# Patient Record
Sex: MALE | Race: WHITE | NOT HISPANIC OR LATINO
[De-identification: names, ages, dates, MRNs, and addresses within clinical notes are randomized per-mention and may not be internally consistent; named-entity substitution may affect disease eponyms.]

---

## 2017-01-11 ENCOUNTER — MEDICATION RENEWAL (OUTPATIENT)
Age: 72
End: 2017-01-11

## 2017-03-28 ENCOUNTER — APPOINTMENT (OUTPATIENT)
Dept: CARDIOLOGY | Facility: CLINIC | Age: 72
End: 2017-03-28

## 2017-03-29 ENCOUNTER — LABORATORY RESULT (OUTPATIENT)
Age: 72
End: 2017-03-29

## 2017-03-29 ENCOUNTER — NON-APPOINTMENT (OUTPATIENT)
Age: 72
End: 2017-03-29

## 2017-03-29 ENCOUNTER — APPOINTMENT (OUTPATIENT)
Dept: CARDIOLOGY | Facility: CLINIC | Age: 72
End: 2017-03-29

## 2017-03-29 VITALS
DIASTOLIC BLOOD PRESSURE: 100 MMHG | HEIGHT: 72 IN | WEIGHT: 269 LBS | BODY MASS INDEX: 36.44 KG/M2 | HEART RATE: 75 BPM | OXYGEN SATURATION: 98 % | SYSTOLIC BLOOD PRESSURE: 150 MMHG

## 2017-03-29 RX ORDER — FINASTERIDE 5 MG/1
5 TABLET, FILM COATED ORAL DAILY
Qty: 90 | Refills: 3 | Status: ACTIVE | COMMUNITY
Start: 2017-03-29

## 2017-03-31 LAB
ALBUMIN SERPL ELPH-MCNC: 4.2 G/DL
ALP BLD-CCNC: 41 U/L
ALT SERPL-CCNC: 29 U/L
ANION GAP SERPL CALC-SCNC: 13 MMOL/L
AST SERPL-CCNC: 31 U/L
BASOPHILS # BLD AUTO: 0.03 K/UL
BASOPHILS NFR BLD AUTO: 0.4 %
BILIRUB SERPL-MCNC: 0.4 MG/DL
BUN SERPL-MCNC: 19 MG/DL
CALCIUM SERPL-MCNC: 9.3 MG/DL
CHLORIDE SERPL-SCNC: 103 MMOL/L
CHOLEST SERPL-MCNC: 151 MG/DL
CHOLEST/HDLC SERPL: 3.3 RATIO
CO2 SERPL-SCNC: 26 MMOL/L
CREAT SERPL-MCNC: 0.92 MG/DL
EOSINOPHIL # BLD AUTO: 0.33 K/UL
EOSINOPHIL NFR BLD AUTO: 4.5 %
GLUCOSE SERPL-MCNC: 104 MG/DL
HCT VFR BLD CALC: 44.5 %
HDLC SERPL-MCNC: 46 MG/DL
HGB BLD-MCNC: 14.7 G/DL
IMM GRANULOCYTES NFR BLD AUTO: 0.8 %
LDLC SERPL CALC-MCNC: 64 MG/DL
LYMPHOCYTES # BLD AUTO: 1.46 K/UL
LYMPHOCYTES NFR BLD AUTO: 20.1 %
MAN DIFF?: NORMAL
MCHC RBC-ENTMCNC: 30.8 PG
MCHC RBC-ENTMCNC: 33 GM/DL
MCV RBC AUTO: 93.1 FL
MONOCYTES # BLD AUTO: 0.76 K/UL
MONOCYTES NFR BLD AUTO: 10.4 %
NEUTROPHILS # BLD AUTO: 4.64 K/UL
NEUTROPHILS NFR BLD AUTO: 63.8 %
PLATELET # BLD AUTO: 100 K/UL
POTASSIUM SERPL-SCNC: 4.4 MMOL/L
PROT SERPL-MCNC: 7.1 G/DL
RBC # BLD: 4.78 M/UL
RBC # FLD: 14.1 %
SODIUM SERPL-SCNC: 142 MMOL/L
TRIGL SERPL-MCNC: 203 MG/DL
TSH SERPL-ACNC: 2.06 UIU/ML
WBC # FLD AUTO: 7.28 K/UL

## 2017-08-22 ENCOUNTER — APPOINTMENT (OUTPATIENT)
Dept: CARDIOLOGY | Facility: CLINIC | Age: 72
End: 2017-08-22
Payer: MEDICARE

## 2017-08-22 ENCOUNTER — NON-APPOINTMENT (OUTPATIENT)
Age: 72
End: 2017-08-22

## 2017-08-22 VITALS
HEIGHT: 72 IN | OXYGEN SATURATION: 93 % | SYSTOLIC BLOOD PRESSURE: 104 MMHG | DIASTOLIC BLOOD PRESSURE: 70 MMHG | BODY MASS INDEX: 37.38 KG/M2 | WEIGHT: 276 LBS | HEART RATE: 62 BPM

## 2017-08-22 PROCEDURE — 93000 ELECTROCARDIOGRAM COMPLETE: CPT

## 2017-08-22 PROCEDURE — 99214 OFFICE O/P EST MOD 30 MIN: CPT

## 2017-09-06 ENCOUNTER — OTHER (OUTPATIENT)
Age: 72
End: 2017-09-06

## 2017-09-06 ENCOUNTER — APPOINTMENT (OUTPATIENT)
Dept: CARDIOLOGY | Facility: CLINIC | Age: 72
End: 2017-09-06
Payer: MEDICARE

## 2017-09-06 PROCEDURE — 93306 TTE W/DOPPLER COMPLETE: CPT

## 2017-09-29 ENCOUNTER — MEDICATION RENEWAL (OUTPATIENT)
Age: 72
End: 2017-09-29

## 2018-01-30 ENCOUNTER — OTHER (OUTPATIENT)
Age: 73
End: 2018-01-30

## 2018-04-03 ENCOUNTER — OTHER (OUTPATIENT)
Age: 73
End: 2018-04-03

## 2018-04-04 ENCOUNTER — OTHER (OUTPATIENT)
Age: 73
End: 2018-04-04

## 2018-05-08 ENCOUNTER — APPOINTMENT (OUTPATIENT)
Dept: CARDIOLOGY | Facility: CLINIC | Age: 73
End: 2018-05-08
Payer: MEDICARE

## 2018-05-08 ENCOUNTER — NON-APPOINTMENT (OUTPATIENT)
Age: 73
End: 2018-05-08

## 2018-05-08 VITALS
BODY MASS INDEX: 36.03 KG/M2 | WEIGHT: 266 LBS | HEIGHT: 72 IN | OXYGEN SATURATION: 95 % | DIASTOLIC BLOOD PRESSURE: 80 MMHG | SYSTOLIC BLOOD PRESSURE: 117 MMHG | HEART RATE: 71 BPM

## 2018-05-08 PROCEDURE — 93000 ELECTROCARDIOGRAM COMPLETE: CPT

## 2018-05-08 PROCEDURE — 99215 OFFICE O/P EST HI 40 MIN: CPT

## 2018-05-11 ENCOUNTER — APPOINTMENT (OUTPATIENT)
Dept: CARDIOLOGY | Facility: CLINIC | Age: 73
End: 2018-05-11
Payer: MEDICARE

## 2018-05-11 VITALS — WEIGHT: 266 LBS | HEART RATE: 71 BPM | BODY MASS INDEX: 36.03 KG/M2 | HEIGHT: 72 IN | OXYGEN SATURATION: 95 %

## 2018-05-11 LAB
INR PPP: 1.3 RATIO
QUALITY CONTROL: YES

## 2018-05-11 PROCEDURE — 85610 PROTHROMBIN TIME: CPT | Mod: QW

## 2018-05-11 PROCEDURE — 99211 OFF/OP EST MAY X REQ PHY/QHP: CPT

## 2018-05-15 ENCOUNTER — APPOINTMENT (OUTPATIENT)
Dept: CARDIOLOGY | Facility: CLINIC | Age: 73
End: 2018-05-15
Payer: MEDICARE

## 2018-05-15 PROCEDURE — 93282 PRGRMG EVAL IMPLANTABLE DFB: CPT

## 2018-05-16 ENCOUNTER — APPOINTMENT (OUTPATIENT)
Dept: ELECTROPHYSIOLOGY | Facility: CLINIC | Age: 73
End: 2018-05-16
Payer: MEDICARE

## 2018-05-16 ENCOUNTER — NON-APPOINTMENT (OUTPATIENT)
Age: 73
End: 2018-05-16

## 2018-05-16 VITALS
DIASTOLIC BLOOD PRESSURE: 86 MMHG | OXYGEN SATURATION: 96 % | SYSTOLIC BLOOD PRESSURE: 132 MMHG | WEIGHT: 259 LBS | HEIGHT: 72 IN | HEART RATE: 75 BPM | BODY MASS INDEX: 35.08 KG/M2

## 2018-05-16 PROCEDURE — 99215 OFFICE O/P EST HI 40 MIN: CPT

## 2018-05-16 PROCEDURE — 93000 ELECTROCARDIOGRAM COMPLETE: CPT

## 2018-05-18 ENCOUNTER — APPOINTMENT (OUTPATIENT)
Dept: CARDIOLOGY | Facility: CLINIC | Age: 73
End: 2018-05-18
Payer: MEDICARE

## 2018-05-18 VITALS — HEART RATE: 75 BPM | BODY MASS INDEX: 35.08 KG/M2 | HEIGHT: 72 IN | WEIGHT: 259 LBS

## 2018-05-18 LAB
INR PPP: 2.3 RATIO
QUALITY CONTROL: YES

## 2018-05-18 PROCEDURE — 85610 PROTHROMBIN TIME: CPT | Mod: QW

## 2018-05-18 PROCEDURE — 99211 OFF/OP EST MAY X REQ PHY/QHP: CPT

## 2018-05-31 ENCOUNTER — APPOINTMENT (OUTPATIENT)
Dept: CARDIOLOGY | Facility: CLINIC | Age: 73
End: 2018-05-31
Payer: MEDICARE

## 2018-05-31 VITALS — HEART RATE: 75 BPM | HEIGHT: 72 IN | WEIGHT: 259 LBS | BODY MASS INDEX: 35.08 KG/M2

## 2018-05-31 LAB
INR PPP: 3.6 RATIO
QUALITY CONTROL: YES

## 2018-05-31 PROCEDURE — 85610 PROTHROMBIN TIME: CPT | Mod: QW

## 2018-05-31 PROCEDURE — 99211 OFF/OP EST MAY X REQ PHY/QHP: CPT

## 2018-05-31 RX ORDER — WARFARIN 5 MG/1
5 TABLET ORAL DAILY
Qty: 90 | Refills: 1 | Status: ACTIVE | COMMUNITY
Start: 2018-05-31

## 2018-06-07 ENCOUNTER — APPOINTMENT (OUTPATIENT)
Dept: CARDIOLOGY | Facility: CLINIC | Age: 73
End: 2018-06-07
Payer: MEDICARE

## 2018-06-07 VITALS — WEIGHT: 259 LBS | HEIGHT: 72 IN | BODY MASS INDEX: 35.08 KG/M2 | HEART RATE: 75 BPM

## 2018-06-07 LAB
INR PPP: 2.2 RATIO
QUALITY CONTROL: YES

## 2018-06-07 PROCEDURE — 85610 PROTHROMBIN TIME: CPT | Mod: QW

## 2018-06-07 PROCEDURE — 99211 OFF/OP EST MAY X REQ PHY/QHP: CPT

## 2018-06-14 ENCOUNTER — APPOINTMENT (OUTPATIENT)
Dept: CARDIOLOGY | Facility: CLINIC | Age: 73
End: 2018-06-14
Payer: MEDICARE

## 2018-06-14 VITALS — WEIGHT: 259 LBS | HEIGHT: 72 IN | HEART RATE: 75 BPM | BODY MASS INDEX: 35.08 KG/M2

## 2018-06-14 LAB
INR PPP: 2.4 RATIO
QUALITY CONTROL: YES

## 2018-06-14 PROCEDURE — 93793 ANTICOAG MGMT PT WARFARIN: CPT

## 2018-06-14 PROCEDURE — 85610 PROTHROMBIN TIME: CPT | Mod: QW

## 2018-06-22 ENCOUNTER — APPOINTMENT (OUTPATIENT)
Dept: CARDIOLOGY | Facility: CLINIC | Age: 73
End: 2018-06-22
Payer: MEDICARE

## 2018-06-22 ENCOUNTER — NON-APPOINTMENT (OUTPATIENT)
Age: 73
End: 2018-06-22

## 2018-06-22 VITALS
WEIGHT: 269 LBS | HEIGHT: 72 IN | BODY MASS INDEX: 36.44 KG/M2 | HEART RATE: 85 BPM | SYSTOLIC BLOOD PRESSURE: 162 MMHG | DIASTOLIC BLOOD PRESSURE: 91 MMHG | OXYGEN SATURATION: 93 %

## 2018-06-22 PROCEDURE — 93000 ELECTROCARDIOGRAM COMPLETE: CPT

## 2018-06-22 PROCEDURE — 99214 OFFICE O/P EST MOD 30 MIN: CPT

## 2018-06-28 ENCOUNTER — APPOINTMENT (OUTPATIENT)
Dept: CARDIOLOGY | Facility: CLINIC | Age: 73
End: 2018-06-28

## 2018-07-11 ENCOUNTER — APPOINTMENT (OUTPATIENT)
Dept: CARDIOLOGY | Facility: CLINIC | Age: 73
End: 2018-07-11
Payer: MEDICARE

## 2018-07-11 VITALS — BODY MASS INDEX: 36.44 KG/M2 | WEIGHT: 269 LBS | HEIGHT: 72 IN | HEART RATE: 85 BPM

## 2018-07-11 LAB
INR PPP: 2.3 RATIO
QUALITY CONTROL: YES

## 2018-07-11 PROCEDURE — 93793 ANTICOAG MGMT PT WARFARIN: CPT

## 2018-07-11 PROCEDURE — 85610 PROTHROMBIN TIME: CPT | Mod: QW

## 2018-08-13 ENCOUNTER — APPOINTMENT (OUTPATIENT)
Dept: CARDIOLOGY | Facility: CLINIC | Age: 73
End: 2018-08-13
Payer: MEDICARE

## 2018-08-13 VITALS — HEIGHT: 72 IN | WEIGHT: 269 LBS | HEART RATE: 85 BPM | BODY MASS INDEX: 36.44 KG/M2

## 2018-08-13 LAB
INR PPP: 3.9 RATIO
QUALITY CONTROL: YES

## 2018-08-13 PROCEDURE — 93793 ANTICOAG MGMT PT WARFARIN: CPT

## 2018-08-13 PROCEDURE — 85610 PROTHROMBIN TIME: CPT | Mod: QW

## 2018-08-17 ENCOUNTER — NON-APPOINTMENT (OUTPATIENT)
Age: 73
End: 2018-08-17

## 2018-08-17 ENCOUNTER — APPOINTMENT (OUTPATIENT)
Dept: CARDIOLOGY | Facility: CLINIC | Age: 73
End: 2018-08-17
Payer: MEDICARE

## 2018-08-17 VITALS
SYSTOLIC BLOOD PRESSURE: 102 MMHG | HEIGHT: 72 IN | OXYGEN SATURATION: 93 % | HEART RATE: 78 BPM | DIASTOLIC BLOOD PRESSURE: 68 MMHG | WEIGHT: 268.03 LBS | BODY MASS INDEX: 36.3 KG/M2

## 2018-08-17 PROCEDURE — 99214 OFFICE O/P EST MOD 30 MIN: CPT

## 2018-08-17 PROCEDURE — 93000 ELECTROCARDIOGRAM COMPLETE: CPT

## 2018-08-27 ENCOUNTER — APPOINTMENT (OUTPATIENT)
Dept: CARDIOLOGY | Facility: CLINIC | Age: 73
End: 2018-08-27
Payer: MEDICARE

## 2018-08-27 VITALS — WEIGHT: 268 LBS | HEART RATE: 78 BPM | HEIGHT: 72 IN | BODY MASS INDEX: 36.3 KG/M2

## 2018-08-27 LAB
INR PPP: 1.4 RATIO
QUALITY CONTROL: YES

## 2018-08-27 PROCEDURE — 93793 ANTICOAG MGMT PT WARFARIN: CPT

## 2018-08-27 PROCEDURE — 85610 PROTHROMBIN TIME: CPT | Mod: QW

## 2018-08-27 RX ORDER — ROSUVASTATIN CALCIUM 10 MG/1
10 TABLET, FILM COATED ORAL
Qty: 90 | Refills: 3 | Status: ACTIVE | COMMUNITY
Start: 2018-08-27

## 2018-08-28 ENCOUNTER — APPOINTMENT (OUTPATIENT)
Dept: CARDIOLOGY | Facility: CLINIC | Age: 73
End: 2018-08-28
Payer: MEDICARE

## 2018-08-28 PROCEDURE — 93283 PRGRMG EVAL IMPLANTABLE DFB: CPT

## 2018-09-26 ENCOUNTER — NON-APPOINTMENT (OUTPATIENT)
Age: 73
End: 2018-09-26

## 2018-09-26 ENCOUNTER — APPOINTMENT (OUTPATIENT)
Dept: CARDIOLOGY | Facility: CLINIC | Age: 73
End: 2018-09-26
Payer: MEDICARE

## 2018-09-26 VITALS
BODY MASS INDEX: 36.03 KG/M2 | OXYGEN SATURATION: 96 % | HEIGHT: 72 IN | DIASTOLIC BLOOD PRESSURE: 78 MMHG | SYSTOLIC BLOOD PRESSURE: 119 MMHG | WEIGHT: 266 LBS | HEART RATE: 76 BPM

## 2018-09-26 DIAGNOSIS — I10 ESSENTIAL (PRIMARY) HYPERTENSION: ICD-10-CM

## 2018-09-26 PROCEDURE — 99214 OFFICE O/P EST MOD 30 MIN: CPT

## 2018-09-26 PROCEDURE — 93000 ELECTROCARDIOGRAM COMPLETE: CPT

## 2018-10-02 ENCOUNTER — CHART COPY (OUTPATIENT)
Age: 73
End: 2018-10-02

## 2018-10-02 LAB — INR PPP: 1.2

## 2018-10-09 ENCOUNTER — APPOINTMENT (OUTPATIENT)
Dept: CARDIOLOGY | Facility: CLINIC | Age: 73
End: 2018-10-09

## 2018-10-22 ENCOUNTER — APPOINTMENT (OUTPATIENT)
Dept: CARDIOLOGY | Facility: CLINIC | Age: 73
End: 2018-10-22
Payer: MEDICARE

## 2018-10-22 VITALS — WEIGHT: 266 LBS | BODY MASS INDEX: 36.03 KG/M2 | HEART RATE: 76 BPM | HEIGHT: 72 IN

## 2018-10-22 LAB
INR PPP: 3.2 RATIO
QUALITY CONTROL: YES

## 2018-10-22 PROCEDURE — 93793 ANTICOAG MGMT PT WARFARIN: CPT

## 2018-10-22 PROCEDURE — 85610 PROTHROMBIN TIME: CPT | Mod: QW

## 2018-11-02 ENCOUNTER — APPOINTMENT (OUTPATIENT)
Dept: CARDIOLOGY | Facility: CLINIC | Age: 73
End: 2018-11-02
Payer: MEDICARE

## 2018-11-02 ENCOUNTER — NON-APPOINTMENT (OUTPATIENT)
Age: 73
End: 2018-11-02

## 2018-11-02 VITALS
HEIGHT: 72 IN | BODY MASS INDEX: 35.89 KG/M2 | WEIGHT: 265 LBS | SYSTOLIC BLOOD PRESSURE: 111 MMHG | HEART RATE: 81 BPM | DIASTOLIC BLOOD PRESSURE: 75 MMHG | OXYGEN SATURATION: 95 %

## 2018-11-02 PROCEDURE — 93000 ELECTROCARDIOGRAM COMPLETE: CPT

## 2018-11-02 PROCEDURE — 99214 OFFICE O/P EST MOD 30 MIN: CPT

## 2018-11-05 ENCOUNTER — APPOINTMENT (OUTPATIENT)
Dept: CARDIOLOGY | Facility: CLINIC | Age: 73
End: 2018-11-05

## 2018-12-18 ENCOUNTER — APPOINTMENT (OUTPATIENT)
Dept: CARDIOLOGY | Facility: CLINIC | Age: 73
End: 2018-12-18
Payer: MEDICARE

## 2018-12-18 PROCEDURE — 93282 PRGRMG EVAL IMPLANTABLE DFB: CPT

## 2019-02-26 ENCOUNTER — APPOINTMENT (OUTPATIENT)
Dept: CARDIOLOGY | Facility: CLINIC | Age: 74
End: 2019-02-26

## 2019-02-26 ENCOUNTER — APPOINTMENT (OUTPATIENT)
Dept: ELECTROPHYSIOLOGY | Facility: CLINIC | Age: 74
End: 2019-02-26
Payer: MEDICARE

## 2019-02-26 ENCOUNTER — NON-APPOINTMENT (OUTPATIENT)
Age: 74
End: 2019-02-26

## 2019-02-26 ENCOUNTER — APPOINTMENT (OUTPATIENT)
Dept: CARDIOLOGY | Facility: CLINIC | Age: 74
End: 2019-02-26
Payer: MEDICARE

## 2019-02-26 VITALS
HEIGHT: 72 IN | SYSTOLIC BLOOD PRESSURE: 103 MMHG | WEIGHT: 262 LBS | DIASTOLIC BLOOD PRESSURE: 70 MMHG | HEART RATE: 76 BPM | OXYGEN SATURATION: 97 % | BODY MASS INDEX: 35.49 KG/M2

## 2019-02-26 VITALS
WEIGHT: 262 LBS | BODY MASS INDEX: 35.49 KG/M2 | DIASTOLIC BLOOD PRESSURE: 69 MMHG | HEIGHT: 72 IN | HEART RATE: 77 BPM | OXYGEN SATURATION: 97 % | SYSTOLIC BLOOD PRESSURE: 104 MMHG

## 2019-02-26 DIAGNOSIS — I47.2 VENTRICULAR TACHYCARDIA: ICD-10-CM

## 2019-02-26 PROCEDURE — 93000 ELECTROCARDIOGRAM COMPLETE: CPT

## 2019-02-26 PROCEDURE — 93283 PRGRMG EVAL IMPLANTABLE DFB: CPT

## 2019-02-26 PROCEDURE — 99215 OFFICE O/P EST HI 40 MIN: CPT

## 2019-02-26 RX ORDER — CLOPIDOGREL BISULFATE 75 MG/1
75 TABLET, FILM COATED ORAL
Qty: 90 | Refills: 3 | Status: DISCONTINUED | COMMUNITY
Start: 2018-05-08 | End: 2019-02-26

## 2019-02-27 ENCOUNTER — APPOINTMENT (OUTPATIENT)
Dept: CARDIOLOGY | Facility: CLINIC | Age: 74
End: 2019-02-27

## 2019-03-25 ENCOUNTER — NON-APPOINTMENT (OUTPATIENT)
Age: 74
End: 2019-03-25

## 2019-03-25 ENCOUNTER — APPOINTMENT (OUTPATIENT)
Dept: CARDIOLOGY | Facility: CLINIC | Age: 74
End: 2019-03-25
Payer: MEDICARE

## 2019-03-25 VITALS
WEIGHT: 265 LBS | HEART RATE: 88 BPM | OXYGEN SATURATION: 95 % | HEIGHT: 72 IN | DIASTOLIC BLOOD PRESSURE: 74 MMHG | SYSTOLIC BLOOD PRESSURE: 110 MMHG | BODY MASS INDEX: 35.89 KG/M2

## 2019-03-25 PROCEDURE — 99214 OFFICE O/P EST MOD 30 MIN: CPT

## 2019-03-25 PROCEDURE — 93000 ELECTROCARDIOGRAM COMPLETE: CPT

## 2019-03-25 NOTE — REVIEW OF SYSTEMS
[Shortness Of Breath] : shortness of breath [Dyspnea on exertion] : dyspnea during exertion [Chest  Pressure] : chest pressure [Chest Pain] : chest pain [Lower Ext Edema] : lower extremity edema [Negative] : Heme/Lymph

## 2019-03-25 NOTE — REASON FOR VISIT
[Atrial Fibrillation] : atrial fibrillation [Cardiomyopathy] : cardiomyopathy [Coronary Artery Disease] : coronary artery disease [Ventricular Tachycardia] : ventricular tachycardia

## 2019-03-25 NOTE — HISTORY OF PRESENT ILLNESS
[FreeTextEntry1] : Mr. Hardin returns for evaluation of his VF, heart failure, coronary artery disease, and atrial fibrillation. He had 2 shocks with syncope in February and was evaluated by electrophysiology. He was started on her nose again although he [facility]. His base rate pacing was increased to 80 per minute. He has not had any further episodes and believes that his energy is somewhat better\par \par \par IN March 2018 he was standing in a restaurant and hitting the back of his head. He reportedly had ventricular fibrillation arrest in his seat ICD fired in conjunction with CPR and ACLS protocol for which he was taken to [facility]. He ultimately underwent percutaneous intervention with stenting to a saphenous vein graft with no significant cardiac enzyme elevation. His course was complicated by hematuria causing warfarin to be discontinued felt to be due to a ruptured renal cyst in the setting of his fall and trauma.\par \par While in [place] last year, his Tikosyn was discontinued for prolongation of his QT interval and he did not tolerate amiodarone or Sotalol. He thinks he is mostly in AF lately.  On a positive note, he remarried his wife of many years that he previously got  from.\par \par Past medical history is remarkable for PAF, BiV ICD with Riata lead followed by Dr. Miller, ischemic CM, hyperlipidemia, coronary artery disease, hypertension, obstructive sleep apnea, posttraumatic stress disorder, agent orange exposure, paroxysmal atrial fibrillation, obesity, status post umbilical hernia repair, osteoarthritis, myocardial infarction, coronary artery bypass surgery approximately 20 years ago, gout, cholecystectomy.

## 2019-03-25 NOTE — DISCUSSION/SUMMARY
[FreeTextEntry1] : Guy has had a complicated course after presenting in [place] with a reported ventricular fibrillation arrest for which his ICD fired appropriately last year . He did not have a significant myocardial infarction and whether or not he benefited from the percutaneous intervention he had in [place] is unclear\par He now presents with recurrent ventricular tachycardia/ventricular fibrillation leading to syncope and ICD shock. We had an extensive discussion concerning his complicated history and counseling represented greater than 50% of his visit counseling and coordination of 35 minutes in duration. Spoke with Dr. Mistry and Angela and we discussed the possibilities of ablation versus other medical therapy. He did not tolerate dofetilide or amiodarone and we did discuss the possibility of adding quinidine. After their evaluation, where no lithium was added although he has not [facility]. He is to start 500 mg twice daily for 7 days and then increase to 1000 mg twice daily. Also, his basic pacing rate was increased to 80 beats per minute. He will followup with electrophysiology and we will see him again in approximately 2 months. He is planning a cruise for this summer.

## 2019-03-25 NOTE — PHYSICAL EXAM
[General Appearance - Well Developed] : well developed [Normal Appearance] : normal appearance [Well Groomed] : well groomed [General Appearance - Well Nourished] : well nourished [No Deformities] : no deformities [General Appearance - In No Acute Distress] : no acute distress [Normal Conjunctiva] : the conjunctiva exhibited no abnormalities [Eyelids - No Xanthelasma] : the eyelids demonstrated no xanthelasmas [Normal Oral Mucosa] : normal oral mucosa [No Oral Pallor] : no oral pallor [No Oral Cyanosis] : no oral cyanosis [Normal Jugular Venous A Waves Present] : normal jugular venous A waves present [Normal Jugular Venous V Waves Present] : normal jugular venous V waves present [No Jugular Venous Bermudez A Waves] : no jugular venous bermudez A waves [Respiration, Rhythm And Depth] : normal respiratory rhythm and effort [Exaggerated Use Of Accessory Muscles For Inspiration] : no accessory muscle use [Auscultation Breath Sounds / Voice Sounds] : lungs were clear to auscultation bilaterally [Abdomen Soft] : soft [Abdomen Tenderness] : non-tender [Abdomen Mass (___ Cm)] : no abdominal mass palpated [Abnormal Walk] : normal gait [Gait - Sufficient For Exercise Testing] : the gait was sufficient for exercise testing [Nail Clubbing] : no clubbing of the fingernails [Cyanosis, Localized] : no localized cyanosis [Petechial Hemorrhages (___cm)] : no petechial hemorrhages [Skin Color & Pigmentation] : normal skin color and pigmentation [] : no rash [No Venous Stasis] : no venous stasis [Skin Lesions] : no skin lesions [No Skin Ulcers] : no skin ulcer [No Xanthoma] : no  xanthoma was observed [Oriented To Time, Place, And Person] : oriented to person, place, and time [Affect] : the affect was normal [Mood] : the mood was normal [No Anxiety] : not feeling anxious [Not Palpable] : not palpable [No Precordial Heave] : no precordial heave was noted [Normal Rate] : normal [Heart Rate ___] : [unfilled] bpm [Irregularly Irregular] : irregularly irregular [Normal S1] : normal S1 [Normal S2] : normal S2 [No Gallop] : no gallop heard [No Murmur] : no murmurs heard [2+] : left 2+ [No Abnormalities] : the abdominal aorta was not enlarged and no bruit was heard [Apical Thrill] : no thrill palpable at the apex [S3] : no S3 [S4] : no S4 [Click] : no click [Pericardial Rub] : no pericardial rub [Right Carotid Bruit] : no bruit heard over the right carotid [Left Carotid Bruit] : no bruit heard over the left carotid [Right Femoral Bruit] : no bruit heard over the right femoral artery [Left Femoral Bruit] : no bruit heard over the left femoral artery [Bruit] : no bruit heard [Rt] : no varicose veins of the right leg [Lt] : no varicose veins of the left leg

## 2019-03-25 NOTE — CARDIOLOGY SUMMARY
[No Ischemia] : no Ischemia [LVEF ___%] : LVEF [unfilled]% [Moderate] : moderate LV dysfunction [None] : no pulmonary hypertension [Enlarged] : enlarged LA size [Mild] : mild mitral regurgitation [___] : [unfilled] [___] : [unfilled]

## 2019-03-27 ENCOUNTER — APPOINTMENT (OUTPATIENT)
Dept: ELECTROPHYSIOLOGY | Facility: CLINIC | Age: 74
End: 2019-03-27

## 2019-04-22 ENCOUNTER — OTHER (OUTPATIENT)
Age: 74
End: 2019-04-22

## 2019-04-22 RX ORDER — RANOLAZINE 500 MG/1
500 TABLET, EXTENDED RELEASE ORAL
Qty: 14 | Refills: 0 | Status: DISCONTINUED | OUTPATIENT
End: 2019-04-22

## 2019-06-05 ENCOUNTER — NON-APPOINTMENT (OUTPATIENT)
Age: 74
End: 2019-06-05

## 2019-06-05 ENCOUNTER — APPOINTMENT (OUTPATIENT)
Dept: CARDIOLOGY | Facility: CLINIC | Age: 74
End: 2019-06-05
Payer: MEDICARE

## 2019-06-05 VITALS
DIASTOLIC BLOOD PRESSURE: 63 MMHG | BODY MASS INDEX: 35.76 KG/M2 | SYSTOLIC BLOOD PRESSURE: 94 MMHG | HEART RATE: 88 BPM | OXYGEN SATURATION: 96 % | WEIGHT: 264 LBS | HEIGHT: 72 IN

## 2019-06-05 PROCEDURE — 99214 OFFICE O/P EST MOD 30 MIN: CPT

## 2019-06-05 PROCEDURE — 93000 ELECTROCARDIOGRAM COMPLETE: CPT

## 2019-06-05 RX ORDER — DILTIAZEM HYDROCHLORIDE 180 MG/1
180 CAPSULE, EXTENDED RELEASE ORAL DAILY
Qty: 30 | Refills: 3 | Status: DISCONTINUED | COMMUNITY
Start: 2018-08-17 | End: 2019-06-05

## 2019-09-24 ENCOUNTER — NON-APPOINTMENT (OUTPATIENT)
Age: 74
End: 2019-09-24

## 2019-09-24 ENCOUNTER — APPOINTMENT (OUTPATIENT)
Dept: CARDIOLOGY | Facility: CLINIC | Age: 74
End: 2019-09-24
Payer: MEDICARE

## 2019-09-24 VITALS
OXYGEN SATURATION: 94 % | WEIGHT: 262 LBS | HEIGHT: 72 IN | SYSTOLIC BLOOD PRESSURE: 104 MMHG | HEART RATE: 100 BPM | BODY MASS INDEX: 35.49 KG/M2 | DIASTOLIC BLOOD PRESSURE: 71 MMHG

## 2019-09-24 PROCEDURE — 93000 ELECTROCARDIOGRAM COMPLETE: CPT

## 2019-09-24 PROCEDURE — 99214 OFFICE O/P EST MOD 30 MIN: CPT

## 2019-09-24 RX ORDER — RANOLAZINE 500 MG/1
500 TABLET, FILM COATED, EXTENDED RELEASE ORAL
Refills: 3 | Status: ACTIVE | COMMUNITY

## 2019-10-24 ENCOUNTER — APPOINTMENT (RX ONLY)
Dept: URBAN - METROPOLITAN AREA CLINIC 124 | Facility: CLINIC | Age: 74
Setting detail: DERMATOLOGY
End: 2019-10-24

## 2019-10-24 DIAGNOSIS — L72.0 EPIDERMAL CYST: ICD-10-CM | Status: RESOLVING

## 2019-10-24 DIAGNOSIS — D18.0 HEMANGIOMA: ICD-10-CM

## 2019-10-24 DIAGNOSIS — D22 MELANOCYTIC NEVI: ICD-10-CM

## 2019-10-24 DIAGNOSIS — L82.1 OTHER SEBORRHEIC KERATOSIS: ICD-10-CM

## 2019-10-24 PROBLEM — D22.4 MELANOCYTIC NEVI OF SCALP AND NECK: Status: ACTIVE | Noted: 2019-10-24

## 2019-10-24 PROBLEM — D18.01 HEMANGIOMA OF SKIN AND SUBCUTANEOUS TISSUE: Status: ACTIVE | Noted: 2019-10-24

## 2019-10-24 PROCEDURE — 17110 DESTRUCTION B9 LES UP TO 14: CPT | Mod: NMN

## 2019-10-24 PROCEDURE — 99203 OFFICE O/P NEW LOW 30 MIN: CPT | Mod: 25

## 2019-10-24 PROCEDURE — ? COUNSELING

## 2019-10-24 PROCEDURE — ? BENIGN DESTRUCTION

## 2019-10-24 PROCEDURE — ? DIAGNOSIS COMMENT

## 2019-10-24 ASSESSMENT — LOCATION ZONE DERM
LOCATION ZONE: SCALP
LOCATION ZONE: ARM
LOCATION ZONE: TRUNK

## 2019-10-24 ASSESSMENT — LOCATION SIMPLE DESCRIPTION DERM
LOCATION SIMPLE: LOWER BACK
LOCATION SIMPLE: ABDOMEN
LOCATION SIMPLE: RIGHT UPPER BACK
LOCATION SIMPLE: UPPER BACK
LOCATION SIMPLE: LEFT UPPER ARM
LOCATION SIMPLE: SCALP

## 2019-10-24 ASSESSMENT — LOCATION DETAILED DESCRIPTION DERM
LOCATION DETAILED: RIGHT MEDIAL UPPER BACK
LOCATION DETAILED: SUPERIOR LUMBAR SPINE
LOCATION DETAILED: LEFT ANTERIOR DISTAL UPPER ARM
LOCATION DETAILED: RIGHT INFERIOR OCCIPITAL SCALP
LOCATION DETAILED: LEFT SUPERIOR POSTAURICULAR SKIN
LOCATION DETAILED: SUPERIOR THORACIC SPINE
LOCATION DETAILED: RIGHT SUPERIOR POSTAURICULAR SKIN
LOCATION DETAILED: PERIUMBILICAL SKIN

## 2019-10-24 NOTE — PROCEDURE: BENIGN DESTRUCTION
Render Note In Bullet Format When Appropriate: No
Anesthesia Volume In Cc: 1
Bill Insurance (You Assume Risk Of Denial Or Audit By Selecting Yes): Yes
Detail Level: Detailed
Post-Care Instructions: I reviewed with the patient in detail post-care instructions. Patient is to wear sunprotection, and avoid picking at any of the treated lesions. Pt may apply Vaseline to crusted or scabbing areas.
Medical Necessity Information: It is in your best interest to select a reason for this procedure from the list below. All of these items fulfill various CMS LCD requirements except the new and changing color options.
Medical Necessity Clause: This procedure was medically necessary because the lesions that were treated were: recurrent
Consent: The patient's consent was obtained including but not limited to risks of crusting, scabbing, blistering, scarring, darker or lighter pigmentary change, recurrence, incomplete removal and infection.

## 2019-11-06 ENCOUNTER — APPOINTMENT (RX ONLY)
Dept: URBAN - METROPOLITAN AREA CLINIC 124 | Facility: CLINIC | Age: 74
Setting detail: DERMATOLOGY
End: 2019-11-06

## 2019-11-06 DIAGNOSIS — L72.0 EPIDERMAL CYST: ICD-10-CM

## 2019-11-06 PROCEDURE — 99024 POSTOP FOLLOW-UP VISIT: CPT

## 2019-11-06 PROCEDURE — ? COUNSELING

## 2019-11-06 PROCEDURE — ? DEFER

## 2019-11-06 ASSESSMENT — LOCATION DETAILED DESCRIPTION DERM: LOCATION DETAILED: RIGHT MEDIAL UPPER BACK

## 2019-11-06 ASSESSMENT — LOCATION ZONE DERM: LOCATION ZONE: TRUNK

## 2019-11-06 ASSESSMENT — LOCATION SIMPLE DESCRIPTION DERM: LOCATION SIMPLE: RIGHT UPPER BACK

## 2019-11-06 NOTE — PROCEDURE: DEFER
Instructions (Optional): Patient needs to see Garrel Arrant to treat. No treatment done to but new bandage applied and appointment scheduled for tomorrow with the Doctor.
Introduction Text (Please End With A Colon): The following procedure was deferred:
Detail Level: Detailed

## 2019-11-08 ENCOUNTER — APPOINTMENT (RX ONLY)
Dept: URBAN - METROPOLITAN AREA CLINIC 124 | Facility: CLINIC | Age: 74
Setting detail: DERMATOLOGY
End: 2019-11-08

## 2019-11-08 DIAGNOSIS — L72.0 EPIDERMAL CYST: ICD-10-CM

## 2019-11-08 DIAGNOSIS — L82.1 OTHER SEBORRHEIC KERATOSIS: ICD-10-CM

## 2019-11-08 PROCEDURE — 99213 OFFICE O/P EST LOW 20 MIN: CPT | Mod: 25

## 2019-11-08 PROCEDURE — ? INTRALESIONAL KENALOG

## 2019-11-08 PROCEDURE — ? PRESCRIPTION

## 2019-11-08 PROCEDURE — ? COUNSELING

## 2019-11-08 PROCEDURE — 11900 INJECT SKIN LESIONS </W 7: CPT

## 2019-11-08 PROCEDURE — ? OTHER

## 2019-11-08 RX ORDER — CEPHALEXIN 500 MG/1
CAPSULE ORAL
Qty: 10 | Refills: 0 | Status: ERX

## 2019-11-08 ASSESSMENT — LOCATION DETAILED DESCRIPTION DERM
LOCATION DETAILED: RIGHT MEDIAL UPPER BACK
LOCATION DETAILED: INFERIOR THORACIC SPINE
LOCATION DETAILED: LEFT SUPERIOR CENTRAL MALAR CHEEK

## 2019-11-08 ASSESSMENT — LOCATION SIMPLE DESCRIPTION DERM
LOCATION SIMPLE: RIGHT UPPER BACK
LOCATION SIMPLE: UPPER BACK
LOCATION SIMPLE: LEFT CHEEK

## 2019-11-08 ASSESSMENT — LOCATION ZONE DERM
LOCATION ZONE: TRUNK
LOCATION ZONE: FACE

## 2019-11-08 NOTE — PROCEDURE: MIPS QUALITY
Quality 130: Documentation Of Current Medications In The Medical Record: Current Medications Documented
Quality 131: Pain Assessment And Follow-Up: Pain assessment using a standardized tool is documented as negative, no follow-up plan required
Detail Level: Simple
Additional Notes: Patient states he is experiencing no pain 0/10

## 2019-11-08 NOTE — PROCEDURE: INTRALESIONAL KENALOG
Kenalog Preparation: Kenalog
Total Volume Injected (Ccs- Only Use Numbers And Decimals): 0.4
Lot # (Optional): [id number]
Treatment Number (Optional): 1
Expiration Date (Optional): COD1981
Concentration Of Solution Injected (Mg/Ml): 5.0
Administered By (Optional): Dr. Alex Rasmussen
Include Z78.9 (Other Specified Conditions Influencing Health Status) As An Associated Diagnosis?: No
Detail Level: Simple
Medical Necessity Clause: This procedure was medically necessary because the lesions that were treated were:
Consent: The risks of atrophy were reviewed with the patient.
X Size Of Lesion In Cm (Optional): 0
Ndc# For Kenalog Only: 9228-6254-98

## 2019-11-20 ENCOUNTER — APPOINTMENT (RX ONLY)
Dept: URBAN - METROPOLITAN AREA CLINIC 124 | Facility: CLINIC | Age: 74
Setting detail: DERMATOLOGY
End: 2019-11-20

## 2019-11-20 DIAGNOSIS — L72.0 EPIDERMAL CYST: ICD-10-CM | Status: IMPROVED

## 2019-11-20 PROCEDURE — ? COUNSELING

## 2019-11-20 PROCEDURE — 99213 OFFICE O/P EST LOW 20 MIN: CPT

## 2019-11-20 PROCEDURE — ? OTHER

## 2019-11-20 ASSESSMENT — LOCATION DETAILED DESCRIPTION DERM: LOCATION DETAILED: RIGHT MEDIAL UPPER BACK

## 2019-11-20 ASSESSMENT — LOCATION ZONE DERM: LOCATION ZONE: TRUNK

## 2019-11-20 ASSESSMENT — LOCATION SIMPLE DESCRIPTION DERM: LOCATION SIMPLE: RIGHT UPPER BACK

## 2019-11-20 NOTE — PROCEDURE: MIPS QUALITY
Quality 131: Pain Assessment And Follow-Up: Pain assessment using a standardized tool is documented as negative, no follow-up plan required
Quality 130: Documentation Of Current Medications In The Medical Record: Current Medications Documented
Additional Notes: Patient states he is experiencing no pain 0/10
Detail Level: Simple

## 2019-11-20 NOTE — PROCEDURE: OTHER
Note Text (......Xxx Chief Complaint.): This diagnosis correlates with the
Other (Free Text): Will follow up in 2 months to decide if sx is needed
Detail Level: Zone

## 2019-12-19 ENCOUNTER — APPOINTMENT (OUTPATIENT)
Dept: CARDIOLOGY | Facility: CLINIC | Age: 74
End: 2019-12-19
Payer: MEDICARE

## 2019-12-19 PROCEDURE — 93296 REM INTERROG EVL PM/IDS: CPT

## 2019-12-19 PROCEDURE — 93295 DEV INTERROG REMOTE 1/2/MLT: CPT

## 2020-02-06 NOTE — PROCEDURE: DIAGNOSIS COMMENT
Comment: Pt was informed the cyst will take about 2-3 months in order to organize
Detail Level: Simple
Breathing spontaneous and unlabored. Breath sounds clear and equal bilaterally with regular rhythm.
Comment: Pt was charged $250 for shave removal

## 2020-03-19 ENCOUNTER — APPOINTMENT (OUTPATIENT)
Dept: CARDIOLOGY | Facility: CLINIC | Age: 75
End: 2020-03-19
Payer: MEDICARE

## 2020-03-19 PROCEDURE — 93296 REM INTERROG EVL PM/IDS: CPT

## 2020-03-19 PROCEDURE — 93295 DEV INTERROG REMOTE 1/2/MLT: CPT

## 2020-06-18 ENCOUNTER — APPOINTMENT (OUTPATIENT)
Dept: CARDIOLOGY | Facility: CLINIC | Age: 75
End: 2020-06-18
Payer: MEDICARE

## 2020-06-18 PROCEDURE — 93295 DEV INTERROG REMOTE 1/2/MLT: CPT

## 2020-06-18 PROCEDURE — 93296 REM INTERROG EVL PM/IDS: CPT

## 2020-09-17 ENCOUNTER — APPOINTMENT (OUTPATIENT)
Dept: CARDIOLOGY | Facility: CLINIC | Age: 75
End: 2020-09-17

## 2020-09-18 ENCOUNTER — APPOINTMENT (OUTPATIENT)
Dept: CARDIOLOGY | Facility: CLINIC | Age: 75
End: 2020-09-18
Payer: MEDICARE

## 2020-09-18 DIAGNOSIS — I48.91 UNSPECIFIED ATRIAL FIBRILLATION: ICD-10-CM

## 2020-09-18 DIAGNOSIS — I25.10 ATHEROSCLEROTIC HEART DISEASE OF NATIVE CORONARY ARTERY W/OUT ANGINA PECTORIS: ICD-10-CM

## 2020-09-18 DIAGNOSIS — I50.9 HEART FAILURE, UNSPECIFIED: ICD-10-CM

## 2020-09-18 PROCEDURE — 99214 OFFICE O/P EST MOD 30 MIN: CPT

## 2021-01-12 ENCOUNTER — NON-APPOINTMENT (OUTPATIENT)
Age: 76
End: 2021-01-12

## 2021-12-02 ENCOUNTER — APPOINTMENT (RX ONLY)
Dept: URBAN - METROPOLITAN AREA CLINIC 124 | Facility: CLINIC | Age: 76
Setting detail: DERMATOLOGY
End: 2021-12-02

## 2021-12-02 DIAGNOSIS — Z01.818 ENCOUNTER FOR OTHER PREPROCEDURAL EXAMINATION: ICD-10-CM

## 2021-12-02 PROBLEM — C44.311 BASAL CELL CARCINOMA OF SKIN OF NOSE: Status: ACTIVE | Noted: 2021-12-02

## 2021-12-02 PROCEDURE — 99213 OFFICE O/P EST LOW 20 MIN: CPT

## 2021-12-02 PROCEDURE — ? COUNSELING

## 2021-12-02 PROCEDURE — ? CONSULTATION FOR MOHS SURGERY

## 2021-12-02 NOTE — PROCEDURE: CONSULTATION FOR MOHS SURGERY
Detail Level: Detailed
Body Location Override (Optional - Billing Will Still Be Based On Selected Body Map Location If Applicable): Left nasal Ala
Size Of Lesion: 0.6
X Size Of Lesion In Cm (Optional): 0.4
Incorporate Mauc In Note: Yes
Location Indication Override (Is Already Calculated Based On Selected Body Location): Area H

## 2021-12-02 NOTE — HPI: MOHS SURGERY CONSULTATION
Has The Cancer Been Biopsied Before?: has been previously biopsied
Additional History: Outside pathology from [facility]
Body Location Override (Optional): Left nasal Ala
Who Is Your Referring Provider?: Sandra Rooney. SERGIO
When Was Your Biopsy?: 11/10/21
Accession (Optional): [id number]

## 2023-02-20 ENCOUNTER — APPOINTMENT (RX ONLY)
Dept: URBAN - METROPOLITAN AREA CLINIC 127 | Facility: CLINIC | Age: 78
Setting detail: DERMATOLOGY
End: 2023-02-20

## 2023-02-20 DIAGNOSIS — Z01.818 ENCOUNTER FOR OTHER PREPROCEDURAL EXAMINATION: ICD-10-CM

## 2023-02-20 PROBLEM — C44.311 BASAL CELL CARCINOMA OF SKIN OF NOSE: Status: ACTIVE | Noted: 2023-02-20

## 2023-02-20 PROCEDURE — 99213 OFFICE O/P EST LOW 20 MIN: CPT

## 2023-02-20 PROCEDURE — ? COUNSELING

## 2023-02-20 PROCEDURE — ? CONSULTATION FOR MOHS SURGERY

## 2023-02-20 NOTE — PROCEDURE: CONSULTATION FOR MOHS SURGERY
Detail Level: Detailed
Body Location Override (Optional - Billing Will Still Be Based On Selected Body Map Location If Applicable): Left nasal Ala
Size Of Lesion: 0.6
Incorporate Mauc In Note: Yes
Location Indication Override (Is Already Calculated Based On Selected Body Location): Area H

## 2023-03-27 ENCOUNTER — APPOINTMENT (RX ONLY)
Dept: URBAN - METROPOLITAN AREA CLINIC 127 | Facility: CLINIC | Age: 78
Setting detail: DERMATOLOGY
End: 2023-03-27

## 2023-03-27 VITALS
HEIGHT: 72 IN | SYSTOLIC BLOOD PRESSURE: 151 MMHG | DIASTOLIC BLOOD PRESSURE: 87 MMHG | WEIGHT: 239 LBS | HEART RATE: 86 BPM

## 2023-03-27 PROBLEM — C44.311 BASAL CELL CARCINOMA OF SKIN OF NOSE: Status: ACTIVE | Noted: 2023-03-27

## 2023-03-27 PROCEDURE — 14060 TIS TRNFR E/N/E/L 10 SQ CM/<: CPT

## 2023-03-27 PROCEDURE — ? MOHS SURGERY

## 2023-03-27 PROCEDURE — ? PRESCRIPTION

## 2023-03-27 PROCEDURE — 17312 MOHS ADDL STAGE: CPT

## 2023-03-27 PROCEDURE — ? MEDICATION COUNSELING

## 2023-03-27 PROCEDURE — 17311 MOHS 1 STAGE H/N/HF/G: CPT

## 2023-03-27 RX ORDER — DOXYCYCLINE HYCLATE 100 MG/1
CAPSULE, GELATIN COATED ORAL BID
Qty: 14 | Refills: 0 | Status: ERX | COMMUNITY
Start: 2023-03-27

## 2023-03-27 RX ADMIN — DOXYCYCLINE HYCLATE: 100 CAPSULE, GELATIN COATED ORAL at 00:00

## 2023-03-27 NOTE — PROCEDURE: MOHS SURGERY
Body Location Override (Optional - Billing Will Still Be Based On Selected Body Map Location If Applicable): Left nasal
Mohs Case Number: [id number]
Date Of Previous Biopsy (Optional): 11/10/21
Previous Accession (Optional): [id number]
Biopsy Photograph Reviewed: Yes
Referring Physician (Optional): LUDIVINA Travis
Consent Type: Consent (Nose Lesion)
Eye Shield Used: No
Surgeon Performing Repair (Optional): Danny Patton MD
Initial Size Of Lesion: 0.8
X Size Of Lesion In Cm (Optional): 0.7
Number Of Stages: 2
Primary Defect Length In Cm (Final Defect Size - Required For Flaps/Grafts): 2.1
Primary Defect Width In Cm (Final Defect Size - Required For Flaps/Grafts): 1.4
Repair Type: Flap
Oculoplastic Surgeon Procedure Text (A): After obtaining clear surgical margins the patient was sent to oculoplastics for surgical repair. The patient understands they will receive post-surgical care and follow-up from the referring physician's office.
Oculoplastic Surgeon Procedure Text (B): After obtaining clear surgical margins the patient was sent to oculoplastics for surgical repair.  The patient understands they will receive post-surgical care and follow-up from the referring physician's office.
Otolaryngologist Procedure Text (A): After obtaining clear surgical margins the patient was sent to otolaryngology for surgical repair. The patient understands they will receive post-surgical care and follow-up from the referring physician's office.
Otolaryngologist Procedure Text (B): After obtaining clear surgical margins the patient was sent to otolaryngology for surgical repair.  The patient understands they will receive post-surgical care and follow-up from the referring physician's office.
Plastic Surgeon Procedure Text (A): After obtaining clear surgical margins the patient was sent to plastics for surgical repair. The patient understands they will receive post-surgical care and follow-up from the referring physician's office.
Plastic Surgeon Procedure Text (B): After obtaining clear surgical margins the patient was sent to plastics for surgical repair.  The patient understands they will receive post-surgical care and follow-up from the referring physician's office.
Mid-Level Procedure Text (A): After obtaining clear surgical margins the patient was sent to a mid-level provider for surgical repair. The patient understands they will receive post-surgical care and follow-up from the mid-level provider.
Mid-Level Procedure Text (B): After obtaining clear surgical margins the patient was sent to a mid-level provider for surgical repair.  The patient understands they will receive post-surgical care and follow-up from the mid-level provider.
Provider Procedure Text (A): After obtaining clear surgical margins the defect was repaired by another provider.
Asc Procedure Text (A): After obtaining clear surgical margins the patient was sent to an [facility] for surgical repair. The patient understands they will receive post-surgical care and follow-up from the [facility] physician.
Asc Procedure Text (B): After obtaining clear surgical margins the patient was sent to an [facility] for surgical repair.  The patient understands they will receive post-surgical care and follow-up from the [facility] physician.
Simple / Intermediate / Complex Repair - Final Wound Length In Cm: 0
Suturegard Retention Suture: 2-0 Nylon
Retention Suture Bite Size: 3 mm
Length To Time In Minutes Device Was In Place: 10
Number Of Hemigard Strips Per Side: 1
Undermining Type: Entire Wound
Debridement Text: The wound edges were debrided prior to proceeding with the closure to facilitate wound healing.
Helical Rim Text: The closure involved the helical rim.
Vermilion Border Text: The closure involved the vermilion border.
Nostril Rim Text: The closure involved the nostril rim.
Retention Suture Text: Retention sutures were placed to support the closure and prevent dehiscence.
Flap Type: Advancement Flap (Single)
Secondary Defect Length In Cm (Required For Flaps): 4.5
Secondary Defect Width In Cm (Required For Flaps): 1.5
Location Indication Override (Is Already Calculated Based On Selected Body Location): Area H
Area H Indication Text: Tumors in this location are included in Area H (eyelids, eyebrows, nose, lips, chin, ear, pre-auricular, post-auricular, temple, genitalia, hands, feet, ankles and areola). Tissue conservation is critical in these anatomic locations.
Area M Indication Text: Tumors in this location are included in Area M (cheek, forehead, scalp, neck, jawline and pretibial skin). Mohs surgery is indicated for tumors in these anatomic locations.
Area L Indication Text: Tumors in this location are included in Area L (trunk and extremities). Mohs surgery is indicated for larger tumors, or tumors with aggressive histologic features, in these anatomic locations.
Tumor Debulked?: curette
Depth Of Tumor Invasion (For Histology): dermis
Perineural Invasion (For Histology - Be Specific If Possible): absent
Surgical Defect Length In Cm (Optional): 1.0
Special Stains Stage 1 - Results: Base On Clearance Noted Above
Histology Selection Override (Optional- Will Default To Parent Diagnosis If N/A): Nodular Basal Cell Carcinoma
Stage 2: Additional Anesthesia Volume In Cc: 4.8
Stage 2: Additional Anesthesia Type: 1% lidocaine with 1:100,000 epinephrine and a 1:10 solution of 8.4% sodium bicarbonate
Staging Info: By selecting yes to the question above you will include information on AJCC 8 tumor staging in your Mohs note. Information on tumor staging will be automatically added for SCCs on the head and neck. AJCC 8 includes tumor size, tumor depth, perineural involvement and bone invasion.
Tumor Depth: Less than 6mm from granular layer and no invasion beyond the subcutaneous fat
Was The Patient On Physician Recommended Anticoagulation Therapy?: Please Select the Appropriate Response
Medical Necessity Statement: Based on my medical judgement, Mohs surgery is the most appropriate treatment for this cancer compared to other treatments. After reviewing the pertinent pathology report, physical exam findings and the various treatment options for skin cancer treatment, standard excision and/or destruction technique methods are not clinically sufficient treatment options. To prevent the risk of compromising surgical cure and reconstruction, prompt microscopic examination of the surgical margins is necessary. Based on the given indications, maximum conservation of healthy tissue, and high cure rate, Mohs surgery is the most appropriate treatment for this cancer. Various treatment options for skin cancer treatment, including but not limited to curettage, excision with frozen sections, excision with permanent sections, photodynamic therapy, radiation therapy, topical chemotherapeutic agents, topical imiquimod, and foregoing treatment were discussed in depth with the patient.
Alternatives Discussed Intro (Do Not Add Period): I discussed alternative treatments to Mohs surgery and specifically discussed the risks and benefits of
Consent 1/Introductory Paragraph: The rationale and indications for Mohs surgery were explained to the patient. The risks, benefits, alternatives to therapy, and expected outcomes were discussed in detail with the patient. The risks, including, but not limited to the following, were mentioned: bleeding, hematoma formation possibly requiring a separate procedure for evacuation, infection, permanent scarring, asymmetry, cosmetic change, loss of function, prolonged wound healing, allergy to anesthesia, nerve injury, loss of sensation, incomplete removal, and recurrence were addressed. The patient verbalized understanding of the discussion and consented to Mohs surgery. \\n\\nPrior the procedure, the treatment site was clearly identified and confirmed by the patient. The treatment site was then marked with a sterile surgical marking pen and the patient confirmed that the marked site was correct. The patient voiced agreement that Mohs surgery is the best treatment option for their lesion. No guarantees were made or implied to the patient. \\n\\nThe patient had an opportunity to ask questions about their procedure. All questions were answered to the patient's satisfaction. I asked the patient if there were any further questions about the procedure and the patient said \"No.\" All components of Universal Protocol/PAUSE Rule completed. Informed verbal and written consent was obtained.
Consent 2/Introductory Paragraph: Mohs surgery was explained to the patient and consent was obtained. The risks, benefits and alternatives to therapy were discussed in detail. Specifically, the risks of infection, scarring, bleeding, prolonged wound healing, incomplete removal, allergy to anesthesia, nerve injury and recurrence were addressed. Prior to the procedure, the treatment site was clearly identified and confirmed by the patient. All components of Universal Protocol/PAUSE Rule completed.
Consent 3/Introductory Paragraph: I gave the patient a chance to ask questions they had about the procedure. Following this I explained the Mohs procedure and consent was obtained. The risks, benefits and alternatives to therapy were discussed in detail. Specifically, the risks of infection, scarring, bleeding, prolonged wound healing, incomplete removal, allergy to anesthesia, nerve injury and recurrence were addressed. Prior to the procedure, the treatment site was clearly identified and confirmed by the patient. All components of Universal Protocol/PAUSE Rule completed.
Consent (Temporal Branch)/Introductory Paragraph: The rationale and indications for Mohs surgery were explained to the patient. The risks, benefits, alternatives to therapy, and expected outcomes were discussed in detail with the patient. The risks, including, but not limited to the following, were mentioned: bleeding, hematoma formation possibly requiring a separate procedure for evacuation, infection, permanent scarring, asymmetry, cosmetic change, loss of function, prolonged wound healing, allergy to anesthesia, nerve injury, damage to the temporal branch of the facial nerve, loss of sensation, incomplete removal, and recurrence were addressed. The patient verbalized understanding of the discussion and consented to Mohs surgery. \\n\\nPrior the procedure, the treatment site was clearly identified and confirmed by the patient. The treatment site was then marked with a sterile surgical marking pen and the patient confirmed that the marked site was correct. The patient voiced agreement that Mohs surgery is the best treatment option for their lesion. No guarantees were made or implied to the patient. \\n\\nThe patient had an opportunity to ask questions about their procedure. All questions were answered to the patient's satisfaction. I asked the patient if there were any further questions about the procedure and the patient said \"No.\" All components of Universal Protocol/PAUSE Rule completed. Informed verbal and written consent was obtained.
Consent (Marginal Mandibular)/Introductory Paragraph: The rationale and indications for Mohs surgery were explained to the patient. The risks, benefits, alternatives to therapy, and expected outcomes were discussed in detail with the patient. The risks, including, but not limited to the following, were mentioned: bleeding, hematoma formation possibly requiring a separate procedure for evacuation, infection, permanent scarring, asymmetry, cosmetic change, loss of function, prolonged wound healing, allergy to anesthesia, nerve injury, damage to the marginal mandibular branch of the facial nerve, loss of sensation, incomplete removal, and recurrence were addressed. The patient verbalized understanding of the discussion and consented to Mohs surgery. \\n\\nPrior the procedure, the treatment site was clearly identified and confirmed by the patient. The treatment site was then marked with a sterile surgical marking pen and the patient confirmed that the marked site was correct. The patient voiced agreement that Mohs surgery is the best treatment option for their lesion. No guarantees were made or implied to the patient. \\n\\nThe patient had an opportunity to ask questions about their procedure. All questions were answered to the patient's satisfaction. I asked the patient if there were any further questions about the procedure and the patient said \"No.\" All components of Universal Protocol/PAUSE Rule completed. Informed verbal and written consent was obtained.
Consent (Spinal Accessory)/Introductory Paragraph: The rationale and indications for Mohs surgery were explained to the patient. The risks, benefits, alternatives to therapy, and expected outcomes were discussed in detail with the patient. The risks, including, but not limited to the following, were mentioned: bleeding, hematoma formation possibly requiring a separate procedure for evacuation, infection, permanent scarring, asymmetry, cosmetic change, loss of function, prolonged wound healing, allergy to anesthesia, nerve injury, damage to the spinal accessory nerve, loss of sensation, incomplete removal, and recurrence were addressed. The patient verbalized understanding of the discussion and consented to Mohs surgery. \\n\\nPrior the procedure, the treatment site was clearly identified and confirmed by the patient. The treatment site was then marked with a sterile surgical marking pen and the patient confirmed that the marked site was correct. The patient voiced agreement that Mohs surgery is the best treatment option for their lesion. No guarantees were made or implied to the patient. \\n\\nThe patient had an opportunity to ask questions about their procedure. All questions were answered to the patient's satisfaction. I asked the patient if there were any further questions about the procedure and the patient said \"No.\" All components of Universal Protocol/PAUSE Rule completed. Informed verbal and written consent was obtained.
Consent (Near Eyelid Margin)/Introductory Paragraph: The rationale and indications for Mohs surgery were explained to the patient. The risks, benefits, alternatives to therapy, and expected outcomes were discussed in detail with the patient. The risks, including, but not limited to the following, were mentioned: bleeding, hematoma formation possibly requiring a separate procedure for evacuation, infection, permanent scarring, asymmetry, cosmetic change, loss of function, ectropion or eyelid deformity, prolonged wound healing, allergy to anesthesia, nerve injury, loss of sensation, incomplete removal, and recurrence were addressed. The patient verbalized understanding of the discussion and consented to Mohs surgery. \\n\\nPrior the procedure, the treatment site was clearly identified and confirmed by the patient. The treatment site was then marked with a sterile surgical marking pen and the patient confirmed that the marked site was correct. The patient voiced agreement that Mohs surgery is the best treatment option for their lesion. No guarantees were made or implied to the patient. \\n\\nThe patient had an opportunity to ask questions about their procedure. All questions were answered to the patient's satisfaction. I asked the patient if there were any further questions about the procedure and the patient said \"No.\" All components of Universal Protocol/PAUSE Rule completed. Informed verbal and written consent was obtained.
Consent (Ear)/Introductory Paragraph: The rationale and indications for Mohs surgery were explained to the patient. The risks, benefits, alternatives to therapy, and expected outcomes were discussed in detail with the patient. The risks, including, but not limited to the following, were mentioned: bleeding, hematoma formation possibly requiring a separate procedure for evacuation, infection, permanent scarring, asymmetry, cosmetic change, loss of function, ear deformity, prolonged wound healing, allergy to anesthesia, nerve injury, loss of sensation, incomplete removal, and recurrence were addressed. The patient verbalized understanding of the discussion and consented to Mohs surgery. \\n\\nPrior the procedure, the treatment site was clearly identified and confirmed by the patient. The treatment site was then marked with a sterile surgical marking pen and the patient confirmed that the marked site was correct. The patient voiced agreement that Mohs surgery is the best treatment option for their lesion. No guarantees were made or implied to the patient. \\n\\nThe patient had an opportunity to ask questions about their procedure. All questions were answered to the patient's satisfaction. I asked the patient if there were any further questions about the procedure and the patient said \"No.\" All components of Universal Protocol/PAUSE Rule completed. Informed verbal and written consent was obtained.
Consent (Nose)/Introductory Paragraph: The rationale and indications for Mohs surgery were explained to the patient. The risks, benefits, alternatives to therapy, and expected outcomes were discussed in detail with the patient. The risks, including, but not limited to the following, were mentioned: bleeding, hematoma formation possibly requiring a separate procedure for evacuation, infection, permanent scarring, asymmetry, cosmetic change, loss of function, nasal deformity, changes in the flow of air through the nose, prolonged wound healing, allergy to anesthesia, nerve injury, loss of sensation, incomplete removal, and recurrence were addressed. The patient verbalized understanding of the discussion and consented to Mohs surgery. \\n\\nPrior the procedure, the treatment site was clearly identified and confirmed by the patient. The treatment site was then marked with a sterile surgical marking pen and the patient confirmed that the marked site was correct. The patient voiced agreement that Mohs surgery is the best treatment option for their lesion. No guarantees were made or implied to the patient. \\n\\nThe patient had an opportunity to ask questions about their procedure. All questions were answered to the patient's satisfaction. I asked the patient if there were any further questions about the procedure and the patient said \"No.\" All components of Universal Protocol/PAUSE Rule completed. Informed verbal and written consent was obtained.
Consent (Lip)/Introductory Paragraph: The rationale and indications for Mohs surgery were explained to the patient. The risks, benefits, alternatives to therapy, and expected outcomes were discussed in detail with the patient. The risks, including, but not limited to the following, were mentioned: bleeding, hematoma formation possibly requiring a separate procedure for evacuation, infection, permanent scarring, asymmetry, cosmetic change, loss of function, lip deformity, changes in the oral aperture, prolonged wound healing, allergy to anesthesia, nerve injury, loss of sensation, incomplete removal, and recurrence were addressed. The patient verbalized understanding of the discussion and consented to Mohs surgery. \\n\\nPrior the procedure, the treatment site was clearly identified and confirmed by the patient. The treatment site was then marked with a sterile surgical marking pen and the patient confirmed that the marked site was correct. The patient voiced agreement that Mohs surgery is the best treatment option for their lesion. No guarantees were made or implied to the patient. \\n\\nThe patient had an opportunity to ask questions about their procedure. All questions were answered to the patient's satisfaction. I asked the patient if there were any further questions about the procedure and the patient said \"No.\" All components of Universal Protocol/PAUSE Rule completed. Informed verbal and written consent was obtained.
Consent (Scalp)/Introductory Paragraph: The rationale and indications for Mohs surgery were explained to the patient. The risks, benefits, alternatives to therapy, and expected outcomes were discussed in detail with the patient. The risks, including, but not limited to the following, were mentioned: bleeding, hematoma formation possibly requiring a separate procedure for evacuation, infection, permanent scarring, asymmetry, cosmetic change, loss of function, changes in hair growth pattern secondary to repair, prolonged wound healing, allergy to anesthesia, nerve injury, loss of sensation, incomplete removal, and recurrence were addressed. The patient verbalized understanding of the discussion and consented to Mohs surgery. \\n\\nPrior the procedure, the treatment site was clearly identified and confirmed by the patient. The treatment site was then marked with a sterile surgical marking pen and the patient confirmed that the marked site was correct. The patient voiced agreement that Mohs surgery is the best treatment option for their lesion. No guarantees were made or implied to the patient. \\n\\nThe patient had an opportunity to ask questions about their procedure. All questions were answered to the patient's satisfaction. I asked the patient if there were any further questions about the procedure and the patient said \"No.\" All components of Universal Protocol/PAUSE Rule completed. Informed verbal and written consent was obtained.
Detail Level: Simple
Postop Diagnosis: same
Surgeon: Nicola Rod MD
Hemostasis: Portable Heat Cautery
Estimated Blood Loss (Cc): minimal
Repair Anesthesia Method: local infiltration
Anesthesia Volume In Cc: 3
Brow Lift Text: A midfrontal incision was made medially to the defect to allow access to the tissues just superior to the left eyebrow. Following careful dissection inferiorly in a supraperiosteal plane to the level of the left eyebrow, several 3-0 monocryl sutures were used to resuspend the eyebrow orbicularis oculi muscular unit to the superior frontal bone periosteum. This resulted in an appropriate reapproximation of static eyebrow symmetry and correction of the left brow ptosis.
Deep Sutures: 4-0 Monocryl
Epidermal Sutures: 5-0 Fast Absorbing Gut
Epidermal Closure: running and interrupted
Suturegard Intro: Intraoperative tissue expansion was performed, utilizing the SUTUREGARD device, in order to reduce wound tension.
Suturegard Body: The suture ends were repeatedly re-tightened and re-clamped to achieve the desired tissue expansion.
Hemigard Intro: Due to skin fragility and wound tension, it was decided to use HEMIGARD adhesive retention suture devices to permit a linear closure. The skin was cleaned and dried for a 6cm distance away from the wound. Excessive hair, if present, was removed to allow for adhesion.
Hemigard Postcare Instructions: The HEMIGARD strips are to remain completely dry for at least 5-7 days.
Donor Site Anesthesia Type: same as repair anesthesia
Epidermal Closure Graft Donor Site (Optional): running
Graft Donor Site Bandage (Optional-Leave Blank If You Don't Want In Note): The donor site was cleansed with sterile saline and dried. Vaseline and a pressure bandage with telfa were applied.
Closure 2 Information: This tab is for additional flaps and grafts, including complex repair and grafts and complex repair and flaps. You can also specify a different location for the additional defect, if the location is the same you do not need to select a new one. We will insert the automated text for the repair you select below just as we do for solitary flaps and grafts. Please note that at this time if you select a location with a different insurance zone you will need to override the ICD10 and CPT if appropriate.
Closure 3 Information: This tab is for additional flaps and grafts above and beyond our usual structured repairs. Please note if you enter information here it will not currently bill and you will need to add the billing information manually.
Closure 4 Information: This tab is for additional flaps and grafts above and beyond our usual structured repairs.  Please note if you enter information here it will not currently bill and you will need to add the billing information manually.
Wound Care: Vaseline
Dressing: pressure dressing with [email or web address]
Wound Care (No Sutures): Petrolatum
Dressing (No Sutures): dry sterile dressing
Unna Boot Text: An Unna boot was placed to help immobilize the limb and facilitate more rapid healing.
Home Suture Removal Text: Patient was provided instructions on removing sutures and will remove their sutures at home. If they have any questions or difficulties they will call the office.
Post-Care Instructions: The patient tolerated the procedure well without any complications. \\n\\nThe patient was provided with detailed written post-operative wound care instructions. These instructions were verbally reviewed in detail with the patient in the office prior to discharge. Postoperative expectations, wound care, recovery time, and recommended activity restrictions were discussed. \\n\\nThe patient was also provided with my cell phone number and asked to please contact me with any questions or concerns. \\n\\nThe patient left the office in good medical condition.
Pain Refusal Text: I offered to prescribe pain medication but the patient refused to take this medication.
Mauc Instructions: By selecting yes to the question below the [facility] number will be added into the note. This will be calculated automatically based on the diagnosis chosen, the size entered, the body zone selected (H,M,L) and the specific indications you chose. You will also have the option to override the Mohs AUC if you disagree with the automatically calculated number and this option is found in the Case Summary tab.
Where Do You Want The Question To Include Opioid Counseling Located?: Case Summary Tab
Eye Protection Verbiage: Before proceeding with the stage, a plastic scleral shield was inserted. The globe was anesthetized with a few drops of 1% lidocaine with 1:100,000 epinephrine. Then, an appropriate sized scleral shield was chosen and coated with lacrilube ointment. The shield was gently inserted and left in place for the duration of each stage. After the stage was completed, the shield was gently removed.
Mohs Method Verbiage: Prior to surgery, a sterile fenestrated drape was placed over the surgical site. An excision with an approximately 45 degree bevel utilizing the standard Mohs approach was performed with a #15 scalpel blade and the specimen was harvested as a microscopically controlled layer.
Surgeon/Pathologist Verbiage (Will Incorporate Name Of Surgeon From Intro If Not Blank): operated in two distinct and integrated capacities as the surgeon and pathologist.
Mohs Histo Method Verbiage: The section(s) were then precisely mapped, inked for orientation, and processed in the [facility] lab following the Mohs protocol and submitted for en-face frozen sections. The frozen section slides were then read by me and the results precisely mapped.
Subsequent Stages Histo Method Verbiage: Prior to surgery, a sterile fenestrated drape was placed over the surgical site. An excision with an approximately 45 degree bevel utilizing the standard Mohs approach was performed with a #15 scalpel blade and the specimen was harvested as a microscopically controlled layer from all areas where tumor was visible on the previous stage. The tissue was again precisely oriented, mapped, dyed, and processed as above.
Mohs Rapid Report Verbiage: The area of clinically evident tumor was marked with skin marking ink and appropriately hatched. The initial incision was made following the Mohs approach through the skin. The specimen was taken to the lab, divided into the necessary number of pieces, chromacoded and processed according to the Mohs protocol. This was repeated in successive stages until a tumor free defect was achieved.
Complex Repair Preamble Text (Leave Blank If You Do Not Want): Various closure techniques were discussed with the patient and it was determined that a Complex Closure would best preserve normal anatomic and functional relationships. \\n\\nPrior to surgery, I explained that by repairing the wound in a linear or curvilinear fashion, we would follow the concept of removing Burow's triangles of skin at each end of a circular defect to allow the sutured line to lie flat. I used the example that we must convert a circular defect to a \"football shape\" in order to create a sutured curved or line closure free of bumps at each end. I discussed the need to remove these standing cutaneous deformities (triangles of skin) at each end of a circular defect in order to repair the wound in such a fashion as to avoid bunching of skin at each end. I explained that we need to \"lengthen\" a wound in order to achieve this more desired and appropriate aesthetic result. \\n\\nThe risks, complications, and alternatives to a Complex Closure were discussed in detail with the patient who verbalized understanding. Risks explained include but are not limited to permanent scar, bleeding, infection, nerve injury, asymmetry, muscle weakness, cosmetic change, wound dehiscence, ecchymosis, swelling, pain, hematoma and the need for additional procedures. \\n\\nUsing a sterile surgical marker, an appropriate Complex Closure was drawn incorporating the defect and placing the expected incisions within the relaxed skin tension lines where possible. Care was taken in the design of this complex closure to have scar camouflage by placing scar lines in the borders of cosmetic units and within dynamic and static rhytids. Care was taken to avoid disruption of the adjacent free margins and to preserve function. \\n\\nThe surgical site was scrubbed with the surgical preparatory solution and draped with a sterile fenestrated drape. Throughout the procedure, the patient was repeatedly instructed to let us know should any pain or discomfort occur. \\n\\nThe edges of the wound were debeveled and the wound defect was recreated. The wound was extensively undermined. Meticulous hemostasis was obtained. Burow's triangles were removed to repair standing cone deformities. The advancing wound edges were then meticulously re-approximated and sewed first with deep sutures and then with superficial sutures.
Intermediate Repair Preamble Text (Leave Blank If You Do Not Want): Various closure techniques were discussed with the patient and it was determined that an Intermediate Layered Closure would best preserve normal anatomic and functional relationships. \\n\\nPrior to surgery, I explained that by repairing the wound in a linear or curvilinear fashion, we would follow the concept of removing Burow's triangles of skin at each end of a circular defect to allow the sutured line to lie flat. I used the example that we must convert a circular defect to a \"football shape\" in order to create a sutured curved or line closure free of bumps at each end. I discussed the need to remove these standing cutaneous deformities (triangles of skin) at each end of a circular defect in order to repair the wound in such a fashion as to avoid bunching of skin at each end. I explained that we need to \"lengthen\" a wound in order to achieve this more desired and appropriate aesthetic result. \\n\\nThe risks, complications, and alternatives to a Layered Intermediate Closure were discussed in detail with the patient who verbalized understanding. Risks explained include but are not limited to permanent scar, bleeding, infection, nerve injury, asymmetry, muscle weakness, cosmetic change, wound dehiscence, ecchymosis, swelling, pain, hematoma and the need for additional procedures. \\n\\nUsing a sterile surgical marker, an appropriate Intermediate Layered Closure was drawn incorporating the defect and placing the expected incisions within the relaxed skin tension lines where possible. Care was taken in the design of this layered repair to have scar camouflage by placing scar lines in the borders of cosmetic units and within dynamic and static rhytids. \\n\\nThe surgical site was scrubbed with the surgical preparatory solution and draped with a sterile fenestrated drape. Throughout the procedure, the patient was repeatedly instructed to let us know should any pain or discomfort occur. \\n\\nThe edges of the wound were debeveled and the wound defect was recreated. The wound was extensively undermined. Meticulous hemostasis was obtained. Burow's triangles were removed to repair standing cone deformities. The advancing wound edges were then meticulously re-approximated and sewed first with deep sutures and then with superficial sutures.
Crescentic Complex Repair Preamble Text (Leave Blank If You Do Not Want): Extensive wide undermining was performed.
Crescentic Intermediate Repair Preamble Text (Leave Blank If You Do Not Want): Undermining was performed with blunt dissection.
Non-Graft Cartilage Fenestration Text: The cartilage was fenestrated with a 2mm punch biopsy to help facilitate healing.
Graft Cartilage Fenestration Text: The cartilage was fenestrated with a 2mm punch biopsy to help facilitate graft survival and healing.
Secondary Intention Text (Leave Blank If You Do Not Want): All other repair options were considered including linear closure, adjacent tissue transfer, and grafting. Because of the size, depth, and location of the defect, it was decided that allowing the wound to heal by secondary intention is the most ideal reconstruction option at this time.
No Repair - Repaired With Adjacent Surgical Defect Text (Leave Blank If You Do Not Want): After obtaining clear surgical margins the defect was repaired concurrently with another surgical defect which was in close approximation.
Referred To Asc For Closure Text (Leave Blank If You Do Not Want): After obtaining clear surgical margins the patient was sent to an ASC for surgical repair. The patient understands they will receive post-surgical care and follow-up from the ASC physician.
Adjacent Tissue Transfer Text: The defect edges were debeveled with a #15 scalpel blade. Given the location of the defect and the proximity to free margins an adjacent tissue transfer was deemed most appropriate. Using a sterile surgical marker, an appropriate flap was drawn incorporating the defect and placing the expected incisions within the relaxed skin tension lines where possible. The area thus outlined was incised deep to adipose tissue with a #15 scalpel blade. The skin margins were undermined to an appropriate distance in all directions utilizing iris scissors.
Advancement Flap (Single) Text: All other repair options were considered including secondary intention healing, linear closure, and grafting. Because of the size, depth, and location of the defect, it was decided that an adjacent tissue transfer repair is the most ideal reconstruction option at this time. \\n\\nThe risks, complications, and alternatives to an Advancement Flap were discussed in detail with the patient who verbalized understanding. Risks explained include but are not limited to permanent scar, bleeding, infection, nerve injury, asymmetry, muscle weakness, cosmetic change, wound dehiscence, ecchymosis, swelling, pain, hematoma and the need for additional procedures. \\n\\nA Flap was designed as follows:\\nType of Flap: Advancement Flap, Flap Subtype: Single Advancement Flap\\n\\nUsing a sterile surgical marker, an appropriate Advancement Flap was drawn incorporating the defect and placing the expected incisions within the relaxed skin tension lines where possible. Care was taken in the design of this advancement flap to have scar camouflage by placing scar lines in the borders of cosmetic units and within dynamic and static rhytids. Care was taken to avoid disruption of the adjacent free margins and to preserve function. \\n\\nThe surgical site was scrubbed with the surgical preparatory solution and draped with a sterile fenestrated drape. Throughout the procedure, the patient was repeatedly instructed to let us know should any pain or discomfort occur. \\n\\nThe defect edges were debeveled. The skin margins were undermined to an appropriate distance in all directions utilizing iris scissors. The area thus outlined was incised deep to adipose tissue with a #15 scalpel blade and elevated with iris scissors. Meticulous hemostasis was achieved. The advancing wound edges were then meticulously re-approximated and sewed first with deep sutures and then with superficial sutures.
Advancement Flap (Double) Text: All other repair options were considered including secondary intention healing, linear closure, and grafting. Because of the size, depth, and location of the defect, it was decided that an adjacent tissue transfer repair is the most ideal reconstruction option at this time. \\n\\nThe risks, complications, and alternatives to an Advancement Flap were discussed in detail with the patient who verbalized understanding. Risks explained include but are not limited to permanent scar, bleeding, infection, nerve injury, asymmetry, muscle weakness, cosmetic change, wound dehiscence, ecchymosis, swelling, pain, hematoma and the need for additional procedures. \\n\\nA Flap was designed as follows:\\nType of Flap: Advancement Flap, Flap Subtype: Double Advancement Flap\\n\\nUsing a sterile surgical marker, an appropriate Advancement Flap was drawn incorporating the defect and placing the expected incisions within the relaxed skin tension lines where possible. Care was taken in the design of this advancement flap to have scar camouflage by placing scar lines in the borders of cosmetic units and within dynamic and static rhytids. Care was taken to avoid disruption of the adjacent free margins and to preserve function. \\n\\nThe surgical site was scrubbed with the surgical preparatory solution and draped with a sterile fenestrated drape. Throughout the procedure, the patient was repeatedly instructed to let us know should any pain or discomfort occur. \\n\\nThe defect edges were debeveled. The skin margins were undermined to an appropriate distance in all directions utilizing iris scissors. The area thus outlined was incised deep to adipose tissue with a #15 scalpel blade and elevated with iris scissors. \\nMeticulous hemostasis was achieved. The advancing wound edges were then meticulously re-approximated and sewed first with deep sutures and then with superficial sutures.
Burow's Advancement Flap Text: ADVANCEMENT FLAP OPERATIVE NOTE DETAILS:\\n\\n All other repair options were considered including secondary intention healing, linear closure, and grafting. Because of the size, depth, and location of the defect, it was decided that an adjacent tissue transfer repair is the most ideal reconstruction option at this time. \\n\\nThe risks, complications, and alternatives to an Advancement Flap were discussed in detail with the patient who verbalized understanding. Risks explained include but are not limited to permanent scar, bleeding, infection, nerve injury, asymmetry, muscle weakness, cosmetic change, wound dehiscence, ecchymosis, swelling, pain, hematoma and the need for additional procedures. \\n\\nA Flap was designed as follows:\\nType of Flap: Advancement Flap, Flap Subtype: Burow's Advancement Flap\\n\\nUsing a sterile surgical marker, an appropriate Advancement Flap was drawn incorporating the defect and placing the expected incisions within the relaxed skin tension lines where possible. Care was taken in the design of this advancement flap to have scar camouflage by placing scar lines in the borders of cosmetic units and within dynamic and static rhytids. Care was taken to avoid disruption of the adjacent free margins and to preserve function. \\n\\nThe surgical site was scrubbed with the surgical preparatory solution and draped with a sterile fenestrated drape. Throughout the procedure, the patient was repeatedly instructed to let us know should any pain or discomfort occur. \\n\\nThe defect edges were debeveled. The skin margins were undermined to an appropriate distance in all directions utilizing iris scissors. The area thus outlined was incised deep to adipose tissue with a #15 scalpel blade and elevated with iris scissors. Meticulous hemostasis was achieved. The advancing wound edges were then meticulously re-approximated and sewed first with deep sutures and then with superficial sutures.
Chonodrocutaneous Helical Advancement Flap Text: The defect edges were debeveled with a #15 scalpel blade. Given the location of the defect and the proximity to free margins a chondrocutaneous helical advancement flap was deemed most appropriate. Using a sterile surgical marker, the appropriate advancement flap was drawn incorporating the defect and placing the expected incisions within the relaxed skin tension lines where possible. The area thus outlined was incised deep to adipose tissue with a #15 scalpel blade. The skin margins were undermined to an appropriate distance in all directions utilizing iris scissors.
Crescentic Advancement Flap Text: All other repair options were considered including secondary intention healing, linear closure, and grafting. Because of the size, depth, and location of the defect, it was decided that an adjacent tissue transfer repair is the most ideal reconstruction option at this time. \\n\\nThe risks, complications, and alternatives to an Advancement Flap were discussed in detail with the patient who verbalized understanding. Risks explained include but are not limited to permanent scar, bleeding, infection, nerve injury, asymmetry, muscle weakness, cosmetic change, wound dehiscence, ecchymosis, swelling, pain, hematoma and the need for additional procedures. \\n\\nA Flap was designed as follows:\\nType of Flap: Advancement Flap, Flap Subtype: Crescentic Advancement Flap\\n\\nUsing a sterile surgical marker, an appropriate Advancement Flap was drawn incorporating the defect and placing the expected incisions within the relaxed skin tension lines where possible. Care was taken in the design of this advancement flap to have scar camouflage by placing scar lines in the borders of cosmetic units and within dynamic and static rhytids. Care was taken to avoid disruption of the adjacent free margins and to preserve function. \\n\\nThe surgical site was scrubbed with the surgical preparatory solution and draped with a sterile fenestrated drape. Throughout the procedure, the patient was repeatedly instructed to let us know should any pain or discomfort occur. \\n\\nThe defect edges were debeveled. The skin margins were undermined to an appropriate distance in all directions utilizing iris scissors. The area thus outlined was incised deep to adipose tissue with a #15 scalpel blade and elevated with iris scissors. Meticulous hemostasis was obtained. The advancing wound edges were then meticulously re-approximated and sewed first with deep sutures and then with superficial sutures.
A-T Advancement Flap Text: All other repair options were considered including secondary intention healing, linear closure, and grafting. Because of the size, depth, and location of the defect, it was decided that an adjacent tissue transfer repair is the most ideal reconstruction option at this time. \\n\\nThe risks, complications, and alternatives to an Advancement Flap were discussed in detail with the patient who verbalized understanding. Risks explained include but are not limited to permanent scar, bleeding, infection, nerve injury, asymmetry, muscle weakness, cosmetic change, wound dehiscence, ecchymosis, swelling, pain, hematoma and the need for additional procedures. \\n\\nA Flap was designed as follows:\\nType of Flap: Advancement Flap, Flap Subtype: A-T Advancement Flap\\n\\nUsing a sterile surgical marker, an appropriate Advancement Flap was drawn incorporating the defect and placing the expected incisions within the relaxed skin tension lines where possible. Care was taken in the design of this advancement flap to have scar camouflage by placing scar lines in the borders of cosmetic units and within dynamic and static rhytids. Care was taken to avoid disruption of the adjacent free margins and to preserve function. \\n\\nThe surgical site was scrubbed with the surgical preparatory solution and draped with a sterile fenestrated drape. Throughout the procedure, the patient was repeatedly instructed to let us know should any pain or discomfort occur. \\n\\nThe defect edges were debeveled. The skin margins were undermined to an appropriate distance in all directions utilizing iris scissors. The area thus outlined was incised deep to adipose tissue with a #15 scalpel blade and elevated with iris scissors. Meticulous hemostasis was obtained. The advancing wound edges were then meticulously re-approximated and sewed first with deep sutures and then with superficial sutures.
O-T Advancement Flap Text: All other repair options were considered including secondary intention healing, linear closure, and grafting. Because of the size, depth, and location of the defect, it was decided that an adjacent tissue transfer repair is the most ideal reconstruction option at this time. \\n\\nThe risks, complications, and alternatives to an Advancement Flap were discussed in detail with the patient who verbalized understanding. Risks explained include but are not limited to permanent scar, bleeding, infection, nerve injury, asymmetry, muscle weakness, cosmetic change, wound dehiscence, ecchymosis, swelling, pain, hematoma and the need for additional procedures. \\n\\nA Flap was designed as follows:\\nType of Flap: Advancement Flap, Flap Subtype: O-T Advancement Flap\\n\\nUsing a sterile surgical marker, an appropriate Advancement Flap was drawn incorporating the defect and placing the expected incisions within the relaxed skin tension lines where possible. Care was taken in the design of this advancement flap to have scar camouflage by placing scar lines in the borders of cosmetic units and within dynamic and static rhytids. Care was taken to avoid disruption of the adjacent free margins and to preserve function. \\n\\nThe surgical site was scrubbed with the surgical preparatory solution and draped with a sterile fenestrated drape. Throughout the procedure, the patient was repeatedly instructed to let us know should any pain or discomfort occur. \\n\\nThe defect edges were debeveled. The skin margins were undermined to an appropriate distance in all directions utilizing iris scissors. The area thus outlined was incised deep to adipose tissue with a #15 scalpel blade and elevated with iris scissors. Meticulous hemostasis was obtained. The advancing wound edges were then meticulously re-approximated and sewed first with deep sutures and then with superficial sutures.
O-L Flap Text: All other repair options were considered including secondary intention healing, linear closure, and grafting. Because of the size, depth, and location of the defect, it was decided that an adjacent tissue transfer repair is the most ideal reconstruction option at this time. \\n\\nThe risks, complications, and alternatives to an Advancement Flap were discussed in detail with the patient who verbalized understanding. Risks explained include but are not limited to permanent scar, bleeding, infection, nerve injury, asymmetry, muscle weakness, cosmetic change, wound dehiscence, ecchymosis, swelling, pain, hematoma and the need for additional procedures. \\n\\nA Flap was designed as follows:\\nType of Flap: Advancement Flap, Flap Subtype: O-L Advancement Flap\\n\\nUsing a sterile surgical marker, an appropriate Advancement Flap was drawn incorporating the defect and placing the expected incisions within the relaxed skin tension lines where possible. Care was taken in the design of this advancement flap to have scar camouflage by placing scar lines in the borders of cosmetic units and within dynamic and static rhytids. Care was taken to avoid disruption of the adjacent free margins and to preserve function. \\n\\nThe surgical site was scrubbed with the surgical preparatory solution and draped with a sterile fenestrated drape. Throughout the procedure, the patient was repeatedly instructed to let us know should any pain or discomfort occur. \\n\\nThe defect edges were debeveled. The skin margins were undermined to an appropriate distance in all directions utilizing iris scissors. \\nThe area thus outlined was incised deep to adipose tissue with a #15 scalpel blade and elevated with iris scissors. Meticulous hemostasis was obtained. The advancing wound edges were then meticulously re-approximated and sewed first with deep sutures and then with superficial sutures.
O-Z Flap Text: All other repair options were considered including secondary intention healing, linear closure, and grafting. Because of the size, depth, and location of the defect, it was decided that an adjacent tissue transfer repair is the most ideal reconstruction option at this time. \\n\\nThe risks, complications, and alternatives to a Rotation Flap were discussed in detail with the patient who verbalized understanding. Risks explained include but are not limited to permanent scar, bleeding, infection, nerve injury, asymmetry, muscle weakness, cosmetic change, wound dehiscence, ecchymosis, swelling, pain, hematoma and the need for additional procedures. \\n\\nA Flap was designed as follows:\\nType of flap: Rotation Flap, Flap Subtype: O-Z Rotation Flap\\n\\nUsing a sterile surgical marker, an appropriate Rotation Flap was drawn incorporating the defect and placing the expected incisions within the relaxed skin tension lines where possible. Care was taken in the design of this rotation flap to have scar camouflage by placing scar lines in the borders of cosmetic units and within dynamic and static rhytids. Care was taken to avoid disruption of the adjacent free margins and to preserve function. \\n\\nThe surgical site was scrubbed with the surgical preparatory solution and draped with a sterile fenestrated drape. Throughout the procedure, the patient was repeatedly instructed to let us know should any pain or discomfort occur. \\n\\nThe defect edges were debeveled. The skin margins were undermined to an appropriate distance in all directions utilizing iris scissors. The area thus outlined was incised deep to adipose tissue with a #15 scalpel blade and elevated with iris scissors. Meticulous hemostasis was obtained. The advancing wound edges were then meticulously re-approximated and sewed first with deep sutures and then with superficial sutures.
Double O-Z Flap Text: All other repair options were considered including secondary intention healing, linear closure, and grafting. Because of the size, depth, and location of the defect, it was decided that an adjacent tissue transfer repair is the most ideal reconstruction option at this time. \\n\\nThe risks, complications, and alternatives to a Rotation Flap were discussed in detail with the patient who verbalized understanding. Risks explained include but are not limited to permanent scar, bleeding, infection, nerve injury, asymmetry, muscle weakness, cosmetic change, wound dehiscence, ecchymosis, swelling, pain, hematoma and the need for additional procedures. \\n\\nA Flap was designed as follows:\\nType of flap: Rotation Flap, Flap Subtype: Double O-Z Rotation Flap\\n\\nUsing a sterile surgical marker, an appropriate Rotation Flap was drawn incorporating the defect and placing the expected incisions within the relaxed skin tension lines where possible. Care was taken in the design of this rotation flap to have scar camouflage by placing scar lines in the borders of cosmetic units and within dynamic and static rhytids. Care was taken to avoid disruption of the adjacent free margins and to preserve function. \\n\\nThe surgical site was scrubbed with the surgical preparatory solution and draped with a sterile fenestrated drape. Throughout the procedure, the patient was repeatedly instructed to let us know should any pain or discomfort occur. \\n\\nThe defect edges were debeveled. The skin margins were undermined to an appropriate distance in all directions utilizing iris scissors. The area thus outlined was incised deep to adipose tissue with a #15 scalpel blade and elevated with iris scissors. Meticulous hemostasis was obtained. The advancing wound edges were then meticulously re-approximated and sewed first with deep sutures and then with superficial sutures.
V-Y Flap Text: All other repair options were considered including secondary intention healing, linear closure, and grafting. Because of the size, depth, and location of the defect, it was decided that an adjacent tissue transfer repair is the most ideal reconstruction option at this time. \\n\\nThe risks, complications, and alternatives to an Advancement Flap were discussed in detail with the patient who verbalized understanding. Risks explained include but are not limited to permanent scar, bleeding, infection, nerve injury, asymmetry, muscle weakness, cosmetic change, wound dehiscence, ecchymosis, swelling, pain, hematoma and the need for additional procedures. \\n\\nA Flap was designed as follows:\\nType of Flap: Advancement Flap, Flap Subtype: V-Y Advancement Flap\\n\\nUsing a sterile surgical marker, an appropriate Advancement Flap was drawn incorporating the defect and placing the expected incisions within the relaxed skin tension lines where possible. Care was taken in the design of this advancement flap to have scar camouflage by placing scar lines in the borders of cosmetic units and within dynamic and static rhytids. Care was taken to avoid disruption of the adjacent free margins and to preserve function. \\n\\nThe surgical site was scrubbed with the surgical preparatory solution and draped with a sterile fenestrated drape. Throughout the procedure, the patient was repeatedly instructed to let us know should any pain or discomfort occur. \\n\\nThe defect edges were debeveled. The skin margins were undermined to an appropriate distance in all directions utilizing iris scissors. The area thus outlined was incised deep to adipose tissue with a #15 scalpel blade and elevated with iris scissors. The skin margins were undermined to an appropriate distance in all directions around the primary defect and laterally outward around the island pedicle utilizing iris scissors. There was minimal undermining beneath the pedicle flap. Meticulous hemostasis was obtained. The advancing wound edges were then meticulously re-approximated and sewed first with deep sutures and then with superficial sutures.
Advancement-Rotation Flap Text: All other repair options were considered including secondary intention healing, linear closure, and grafting. Because of the size, depth, and location of the defect, it was decided that an adjacent tissue transfer repair is the most ideal reconstruction option at this time. \\n\\nThe risks, complications, and alternatives to an Advancement Flap were discussed in detail with the patient who verbalized understanding. Risks explained include but are not limited to permanent scar, bleeding, infection, nerve injury, asymmetry, muscle weakness, cosmetic change, wound dehiscence, ecchymosis, swelling, pain, hematoma and the need for additional procedures. \\n\\nA Flap was designed as follows:\\nType of Flap: Advancement Flap, Flap Subtype: Advancement-Rotation Flap\\n\\nUsing a sterile surgical marker, an appropriate Advancement Flap was drawn incorporating the defect and placing the expected incisions within the relaxed skin tension lines where possible. Care was taken in the design of this advancement rotation flap to have scar camouflage by placing scar lines in the borders of cosmetic units and within dynamic and static rhytids. Care was taken to avoid disruption of the adjacent free margins and to preserve function. \\n\\nThe surgical site was scrubbed with the surgical preparatory solution and draped with a sterile fenestrated drape. Throughout the procedure, the patient was repeatedly instructed to let us know should any pain or discomfort occur. \\n\\nThe defect edges were debeveled. The skin margins were undermined to an appropriate distance in all directions utilizing iris scissors. The area thus outlined was incised deep to adipose tissue with a #15 scalpel blade and elevated with iris scissors. Meticulous hemostasis was obtained. The advancing wound edges were then meticulously re-approximated and sewed first with deep sutures and then with superficial sutures.
Mercedes Flap Text: The defect edges were debeveled with a #15 scalpel blade. Given the location of the defect, shape of the defect and the proximity to free margins a Mercedes flap was deemed most appropriate. Using a sterile surgical marker, an appropriate advancement flap was drawn incorporating the defect and placing the expected incisions within the relaxed skin tension lines where possible. The area thus outlined was incised deep to adipose tissue with a #15 scalpel blade. The skin margins were undermined to an appropriate distance in all directions utilizing iris scissors.
Modified Advancement Flap Text: The defect edges were debeveled with a #15 scalpel blade. Given the location of the defect, shape of the defect and the proximity to free margins a modified advancement flap was deemed most appropriate. Using a sterile surgical marker, an appropriate advancement flap was drawn incorporating the defect and placing the expected incisions within the relaxed skin tension lines where possible. The area thus outlined was incised deep to adipose tissue with a #15 scalpel blade. The skin margins were undermined to an appropriate distance in all directions utilizing iris scissors.
Mucosal Advancement Flap Text: All other repair options were considered including secondary intention healing, linear closure, and grafting. Because of the size, depth, and location of the defect, it was decided that an adjacent tissue transfer repair is the most ideal reconstruction option at this time. \\n\\nThe risks, complications, and alternatives to an Advancement Flap were discussed in detail with the patient who verbalized understanding. Risks explained include but are not limited to permanent scar, bleeding, infection, nerve injury, asymmetry, muscle weakness, cosmetic change, wound dehiscence, ecchymosis, swelling, pain, hematoma and the need for additional procedures. \\n\\nA Flap was designed as follows:\\nType of Flap: Advancement Flap, Flap Subtype: Mucosal Advancement Flap\\n\\nUsing a sterile surgical marker, an appropriate Advancement Flap was drawn incorporating the defect and placing the expected incisions within the relaxed skin tension lines where possible. Care was taken in the design of this advancement flap to have scar camouflage by placing scar lines in the borders of cosmetic units and within dynamic and static rhytids. Care was taken to avoid disruption of the adjacent free margins and to preserve function. \\n\\nThe surgical site was scrubbed with the surgical preparatory solution and draped with a sterile fenestrated drape. Throughout the procedure, the patient was repeatedly instructed to let us know should any pain or discomfort occur. The defect edges were debeveled. The mucosa was undermined anterior to the minor salivary glands and posterior to the orbicularis oris muscle. Meticulous hemostasis was obtained. The advancing wound edges were then meticulously re-approximated and sewed first with deep sutures and then with superficial sutures.
Peng Advancement Flap Text: The defect edges were debeveled with a #15 scalpel blade. Given the location of the defect, shape of the defect and the proximity to free margins a Peng advancement flap was deemed most appropriate. Using a sterile surgical marker, an appropriate advancement flap was drawn incorporating the defect and placing the expected incisions within the relaxed skin tension lines where possible. The area thus outlined was incised deep to adipose tissue with a #15 scalpel blade. The skin margins were undermined to an appropriate distance in all directions utilizing iris scissors.
Hatchet Flap Text: The defect edges were debeveled with a #15 scalpel blade. Given the location of the defect, shape of the defect and the proximity to free margins a hatchet flap was deemed most appropriate. Using a sterile surgical marker, an appropriate hatchet flap was drawn incorporating the defect and placing the expected incisions within the relaxed skin tension lines where possible. The area thus outlined was incised deep to adipose tissue with a #15 scalpel blade. The skin margins were undermined to an appropriate distance in all directions utilizing iris scissors.
Rotation Flap Text: All other repair options were considered including secondary intention healing, linear closure, and grafting. Because of the size, depth, and location of the defect, it was decided that an adjacent tissue transfer repair is the most ideal reconstruction option at this time. \\n\\nThe risks, complications, and alternatives to a Rotation Flap were discussed in detail with the patient who verbalized understanding. Risks explained include but are not limited to permanent scar, bleeding, infection, nerve injury, asymmetry, muscle weakness, cosmetic change, wound dehiscence, ecchymosis, swelling, pain, hematoma and the need for additional procedures. \\n\\nA Flap was designed as follows: Rotation Flap\\n\\nUsing a sterile surgical marker, an appropriate Rotation Flap was drawn incorporating the defect and placing the expected incisions within the relaxed skin tension lines where possible. Care was taken in the design of this rotation flap to have scar camouflage by placing scar lines in the borders of cosmetic units and within dynamic and static rhytids. Care was taken to avoid disruption of the adjacent free margins and to preserve function. \\n\\nThe surgical site was scrubbed with the surgical preparatory solution and draped with a sterile fenestrated drape. Throughout the procedure, the patient was repeatedly instructed to let us know should any pain or discomfort occur. \\n\\nThe defect edges were debeveled. The skin margins were undermined to an appropriate distance in all directions utilizing iris scissors. The area thus outlined was incised deep to adipose tissue with a #15 scalpel blade and elevated with iris scissors. Meticulous hemostasis was obtained. The advancing wound edges were then meticulously re-approximated and sewed first with deep sutures and then with superficial sutures.
Spiral Flap Text: All other repair options were considered including secondary intention healing, linear closure, and grafting. Because of the size, depth, and location of the defect, it was decided that an adjacent tissue transfer repair is the most ideal reconstruction option at this time. \\n\\nThe risks, complications, and alternatives to a Rotation Flap were discussed in detail with the patient who verbalized understanding. Risks explained include but are not limited to permanent scar, bleeding, infection, nerve injury, asymmetry, muscle weakness, cosmetic change, wound dehiscence, ecchymosis, swelling, pain, hematoma and the need for additional procedures. \\n\\nA Flap was designed as follows:\\nType of flap: Rotation Flap, Flap Subtype: Spiral Flap\\n\\nUsing a sterile surgical marker, an appropriate Rotation Flap was drawn incorporating the defect and placing the expected incisions within the relaxed skin tension lines where possible. Care was taken in the design of this rotation flap to have scar camouflage by placing scar lines in the borders of cosmetic units and within dynamic and static rhytids. Care was taken to avoid disruption of the adjacent free margins and to preserve function. \\n\\nThe surgical site was scrubbed with the surgical preparatory solution and draped with a sterile fenestrated drape. Throughout the procedure, the patient was repeatedly instructed to let us know should any pain or discomfort occur. \\n\\nThe defect edges were debeveled. The skin margins were undermined to an appropriate distance in all directions utilizing iris scissors. The area thus outlined was incised deep to adipose tissue with a #15 scalpel blade and elevated with iris scissors. Meticulous hemostasis was obtained. The advancing wound edges were then meticulously re-approximated and sewed first with deep sutures and then with superficial sutures.
Staged Advancement Flap Text: The defect edges were debeveled with a #15 scalpel blade. Given the location of the defect, shape of the defect and the proximity to free margins a staged advancement flap was deemed most appropriate. Using a sterile surgical marker, an appropriate advancement flap was drawn incorporating the defect and placing the expected incisions within the relaxed skin tension lines where possible. The area thus outlined was incised deep to adipose tissue with a #15 scalpel blade. The skin margins were undermined to an appropriate distance in all directions utilizing iris scissors.
Star Wedge Flap Text: The defect edges were debeveled with a #15 scalpel blade. Given the location of the defect, shape of the defect and the proximity to free margins a star wedge flap was deemed most appropriate. Using a sterile surgical marker, an appropriate rotation flap was drawn incorporating the defect and placing the expected incisions within the relaxed skin tension lines where possible. The area thus outlined was incised deep to adipose tissue with a #15 scalpel blade. The skin margins were undermined to an appropriate distance in all directions utilizing iris scissors.
Transposition Flap Text: All other repair options were considered including secondary intention healing, linear closure, and grafting. Because of the size, depth, and location of the defect, it was decided that an adjacent tissue transfer repair is the most ideal reconstruction option at this time. \\n\\nThe risks, complications, and alternatives to a Transposition Flap were discussed in detail with the patient who verbalized understanding. Risks explained include but are not limited to permanent scar, bleeding, infection, nerve injury, asymmetry, muscle weakness, cosmetic change, wound dehiscence, ecchymosis, swelling, pain, hematoma and the need for additional procedures. \\n\\nA Flap was designed as follows: Transposition Flap\\n\\nUsing a sterile surgical marker, an appropriate Transposition Flap was drawn incorporating the defect and placing the expected incisions within the relaxed skin tension lines where possible. Care was taken in the design of this transposition flap to have scar camouflage by placing scar lines in the borders of cosmetic units and within dynamic and static rhytids. Care was taken to avoid disruption of the adjacent free margins and to preserve function. \\n\\nThe surgical site was scrubbed with the surgical preparatory solution and draped with a sterile fenestrated drape. Throughout the procedure, the patient was repeatedly instructed to let us know should any pain or discomfort occur. \\n\\nThe defect edges were debeveled. The skin margins were undermined to an appropriate distance in all directions utilizing iris scissors. The area thus outlined was incised deep to adipose tissue with a #15 scalpel blade and elevated with iris scissors. Meticulous hemostasis was obtained. The advancing wound edges were then meticulously re-approximated and sewed first with deep sutures and then with superficial sutures.
Muscle Hinge Flap Text: All other repair options were considered including secondary intention healing, linear closure, and grafting. Because of the size, depth, and location of the defect, it was decided that  a muscular hinge flap was needed prior to graft repair to replace volume and support the overlying graft. It was decided that this is most ideal reconstruction option at this time. \\n\\nThe risks, complications, and alternatives to a Muscle Hinge Flap were discussed in detail with the patient who verbalized understanding. Risks explained include but are not limited to permanent scar, bleeding, infection, nerve injury, asymmetry, muscle weakness, cosmetic change, wound dehiscence, ecchymosis, swelling, pain, hematoma and the need for additional procedures. \\n\\nA Flap was designed as follows: Muscle Hinge Flap\\n\\nUsing a sterile surgical marker, an appropriate Muscle Hinge Flap was drawn incorporating the defect and placing the expected incisions within the relaxed skin tension lines where possible. Care was taken in the design of this muscle hinge flap to have scar camouflage by placing scar lines in the borders of cosmetic units and within dynamic and static rhytids. Care was taken to avoid disruption of the adjacent free margins and to preserve function. \\n\\nThe surgical site was scrubbed with the surgical preparatory solution and draped with a sterile fenestrated drape. Throughout the procedure, the patient was repeatedly instructed to let us know should any pain or discomfort occur. \\n\\nThe defect edges were debeveled. The skin margins were undermined to an appropriate distance in all directions utilizing iris scissors. The area thus outlined was incised deep to adipose tissue with a #15 scalpel blade and elevated with iris scissors. Meticulous hemostasis was obtained. A muscle hinge flap was incised and folded over onto itself like the page of a book and secured with an absorbable suture. The wound was left open in anticipation of the following FTSG.
Mustarde Flap Text: The defect edges were debeveled with a #15 scalpel blade. Given the size, depth and location of the defect and the proximity to free margins a Mustarde flap was deemed most appropriate. Using a sterile surgical marker, an appropriate flap was drawn incorporating the defect. The area thus outlined was incised with a #15 scalpel blade. The skin margins were undermined to an appropriate distance in all directions utilizing iris scissors.
Nasal Turnover Hinge Flap Text: The defect edges were debeveled with a #15 scalpel blade. Given the size, depth, location of the defect and the defect being full thickness a nasal turnover hinge flap was deemed most appropriate. Using a sterile surgical marker, an appropriate hinge flap was drawn incorporating the defect. The area thus outlined was incised with a #15 scalpel blade. The flap was designed to recreate the nasal mucosal lining and the alar rim. The skin margins were undermined to an appropriate distance in all directions utilizing iris scissors.
Nasalis-Muscle-Based Myocutaneous Island Pedicle Flap Text: All other repair options were considered including secondary intention healing, linear closure, and grafting. Because of the size, depth, and location of the defect, it was decided that an adjacent tissue transfer repair is the most ideal reconstruction option at this time. \\n\\nThe risks, complications, and alternatives to an Nasalis-Muscle-Based Myocutaneous Island Pedicle Flap were discussed in detail with the patient who verbalized understanding. Risks explained include but are not limited to permanent scar, bleeding, infection, nerve injury, asymmetry, muscle weakness, cosmetic change, wound dehiscence, ecchymosis, swelling, pain, hematoma and the need for additional procedures. \\n\\nA Flap was designed as follows:\\nType of Flap: Advancement Flap, Flap Subtype: Nasalis-Muscle-Based Myocutaneous Island Pedicle Flap\\n\\nUsing a sterile surgical marker, an appropriate Nasalis-Muscle-Based Myocutaneous Island Pedicle Flap was drawn incorporating the defect and placing the expected incisions within the relaxed skin tension lines where possible. Care was taken in the design of this advancement flap to have scar camouflage by placing scar lines in the borders of cosmetic units and within dynamic and static rhytids. Care was taken to avoid disruption of the adjacent free margins and to preserve function. \\n\\nThe surgical site was scrubbed with the surgical preparatory solution and draped with a sterile fenestrated drape. Throughout the procedure, the patient was repeatedly instructed to let us know should any pain or discomfort occur. \\n\\nThe defect edges were debeveled. The skin margins were undermined to an appropriate distance in all directions utilizing iris scissors. Using a #15 blade, an incision was made around the donor flap to the level of the nasalis muscle. Wide lateral undermining was then performed in both the subcutaneous plane above the nasalis muscle, and in a submuscular plane just above periosteum. This allowed the formation of a free nasalis muscle axial pedicle (based on the angular artery) which was still attached to the actual cutaneous flap, increasing its mobility and vascular viability. Meticulous hemostasis was obtained. The flap was mobilized into position and the pivotal anchor points positioned and stabilized with buried interrupted sutures. The advancing wound edges were then meticulously re-approximated and sewed first with deep sutures and then with superficial sutures.
Orbicularis Oris Muscle Flap Text: The defect edges were debeveled with a #15 scalpel blade. Given that the defect affected the competency of the oral sphincter an obicularis oris muscle flap was deemed most appropriate to restore this competency and normal muscle function. Using a sterile surgical marker, an appropriate flap was drawn incorporating the defect. The area thus outlined was incised with a #15 scalpel blade.
Melolabial Transposition Flap Text: The defect edges were debeveled with a #15 scalpel blade. Given the location of the defect and the proximity to free margins a melolabial flap was deemed most appropriate. Using a sterile surgical marker, an appropriate melolabial transposition flap was drawn incorporating the defect. The area thus outlined was incised deep to adipose tissue with a #15 scalpel blade. The skin margins were undermined to an appropriate distance in all directions utilizing iris scissors.
Rhombic Flap Text: All other repair options were considered including secondary intention healing, linear closure, and grafting. Because of the size, depth, and location of the defect, it was decided that an adjacent tissue transfer repair is the most ideal reconstruction option at this time. \\n\\nThe risks, complications, and alternatives to a Rhombic Flap were discussed in detail with the patient who verbalized understanding. Risks explained include but are not limited to permanent scar, bleeding, infection, nerve injury, asymmetry, muscle weakness, cosmetic change, wound dehiscence, ecchymosis, swelling, pain, hematoma and the need for additional procedures. \\n\\nA Flap was designed as follows: Rhombic Flap\\n\\nUsing a sterile surgical marker, an appropriate Rhombic Flap was drawn incorporating the defect and placing the expected incisions within the relaxed skin tension lines where possible. Care was taken in the design of this transposition flap to have scar camouflage by placing scar lines in the borders of cosmetic units and within dynamic and static rhytids. Care was taken to avoid disruption of the adjacent free margins and to preserve function. \\n\\nThe surgical site was scrubbed with the surgical preparatory solution and draped with a sterile fenestrated drape. Throughout the procedure, the patient was repeatedly instructed to let us know should any pain or discomfort occur. \\n\\nThe defect edges were debeveled. The skin margins were undermined to an appropriate distance in all directions utilizing iris scissors. The area thus outlined was incised deep to adipose tissue with a #15 scalpel blade and elevated with iris scissors. Meticulous hemostasis was obtained. The advancing wound edges were then meticulously re-approximated and sewed first with deep sutures and then with superficial sutures.
Rhomboid Transposition Flap Text: The defect edges were debeveled with a #15 scalpel blade. Given the location of the defect and the proximity to free margins a rhomboid transposition flap was deemed most appropriate. Using a sterile surgical marker, an appropriate rhomboid flap was drawn incorporating the defect. The area thus outlined was incised deep to adipose tissue with a #15 scalpel blade. The skin margins were undermined to an appropriate distance in all directions utilizing iris scissors.
Bi-Rhombic Flap Text: The defect edges were debeveled with a #15 scalpel blade. Given the location of the defect and the proximity to free margins a bi-rhombic flap was deemed most appropriate. Using a sterile surgical marker, an appropriate rhombic flap was drawn incorporating the defect. The area thus outlined was incised deep to adipose tissue with a #15 scalpel blade. The skin margins were undermined to an appropriate distance in all directions utilizing iris scissors.
Helical Rim Advancement Flap Text: All other repair options were considered including secondary intention healing, linear closure, and grafting. Because of the size, depth, and location of the defect, it was decided that an adjacent tissue transfer repair is the most ideal reconstruction option at this time. \\n\\nThe risks, complications, and alternatives to an Advancement Flap were discussed in detail with the patient who verbalized understanding. Risks explained include but are not limited to permanent scar, bleeding, infection, nerve injury, asymmetry, muscle weakness, cosmetic change, wound dehiscence, ecchymosis, swelling, pain, hematoma and the need for additional procedures. \\n\\nA Flap was designed as follows:\\nType of flap: Advancement Flap, Flap subtype: Helical Rim Advancement Flap\\n\\nUsing a sterile surgical marker, an appropriate Advancement Flap was drawn incorporating the defect and placing the expected incisions between the helical rim and antihelix where possible. Care was taken in the design of this advancement flap to have scar camouflage by placing scar lines in the borders of cosmetic units and within dynamic and static rhytids. Care was taken to avoid disruption of the adjacent free margins and to preserve function. \\n\\nThe surgical site was scrubbed with the surgical preparatory solution and draped with a sterile fenestrated drape. Throughout the procedure, the patient was repeatedly instructed to let us know should any pain or discomfort occur. \\n\\nThe defect edges were debeveled. The skin margins were undermined to an appropriate distance in all directions utilizing iris scissors. The area thus outlined was incised deep to adipose tissue with a #15 scalpel blade and elevated with iris scissors. Meticulous hemostasis was obtained. The advancing wound edges were then meticulously re-approximated and sewed first with deep sutures and then with superficial sutures.
Bilateral Helical Rim Advancement Flap Text: The defect edges were debeveled with a #15 blade scalpel. Given the location of the defect and the proximity to free margins (helical rim) a bilateral helical rim advancement flap was deemed most appropriate. Using a sterile surgical marker, the appropriate advancement flaps were drawn incorporating the defect and placing the expected incisions between the helical rim and antihelix where possible. The area thus outlined was incised through and through with a #15 scalpel blade. With a skin hook and iris scissors, the flaps were gently and sharply undermined and freed up.
Ear Star Wedge Flap Text: The defect edges were debeveled with a #15 blade scalpel. Given the location of the defect and the proximity to free margins (helical rim) an ear star wedge flap was deemed most appropriate. Using a sterile surgical marker, the appropriate flap was drawn incorporating the defect and placing the expected incisions between the helical rim and antihelix where possible. The area thus outlined was incised through and through with a #15 scalpel blade.
Banner Transposition Flap Text: All other repair options were considered including secondary intention healing, linear closure, and grafting. Because of the size, depth, and location of the defect, it was decided that an adjacent tissue transfer repair is the most ideal reconstruction option at this time. \\n\\nThe risks, complications, and alternatives to a Transposition Flap were discussed in detail with the patient who verbalized understanding. Risks explained include but are not limited to permanent scar, bleeding, infection, nerve injury, asymmetry, muscle weakness, cosmetic change, wound dehiscence, ecchymosis, swelling, pain, hematoma and the need for additional procedures. \\n\\nA Flap was designed as follows: \\nType of Flap: Transposition Flap, Flap Subtype: Banner Transposition Flap\\n\\nUsing a sterile surgical marker, an appropriate Transposition Flap was drawn incorporating the defect and placing the expected incisions within the relaxed skin tension lines where possible. Care was taken in the design of this transposition flap to have scar camouflage by placing scar lines in the borders of cosmetic units and within dynamic and static rhytids. Care was taken to avoid disruption of the adjacent free margins and to preserve function. \\n\\nThe surgical site was scrubbed with the surgical preparatory solution and draped with a sterile fenestrated drape. Throughout the procedure, the patient was repeatedly instructed to let us know should any pain or discomfort occur. \\n\\nThe defect edges were debeveled. The skin margins were undermined to an appropriate distance in all directions utilizing iris scissors. The area thus outlined was incised deep to adipose tissue with a #15 scalpel blade and elevated with iris scissors. Meticulous hemostasis was obtained. The advancing wound edges were then meticulously re-approximated and sewed first with deep sutures and then with superficial sutures.
Bilobed Flap Text: All other repair options were considered including secondary intention healing, linear closure, and grafting. Because of the size, depth, and location of the defect, it was decided that an adjacent tissue transfer repair is the most ideal reconstruction option at this time. \\n\\nThe risks, complications, and alternatives to a Transposition Flap were discussed in detail with the patient who verbalized understanding. Risks explained include but are not limited to permanent scar, bleeding, infection, nerve injury, asymmetry, muscle weakness, cosmetic change, wound dehiscence, ecchymosis, swelling, pain, hematoma and the need for additional procedures. \\n\\nA Flap was designed as follows: \\nType of Flap: Transposition Flap, Flap Subtype: Bilobed Flap\\n\\nUsing a sterile surgical marker, an appropriate Transposition Flap was drawn incorporating the defect and placing the expected incisions within the relaxed skin tension lines where possible. Care was taken in the design of this transposition flap to have scar camouflage by placing scar lines in the borders of cosmetic units and within dynamic and static rhytids. Care was taken to avoid disruption of the adjacent free margins and to preserve function. \\n\\nThe surgical site was scrubbed with the surgical preparatory solution and draped with a sterile fenestrated drape. Throughout the procedure, the patient was repeatedly instructed to let us know should any pain or discomfort occur. \\n\\nThe defect edges were debeveled. The skin margins were undermined to an appropriate distance in all directions utilizing iris scissors. The area thus outlined was incised deep to adipose tissue with a #15 scalpel blade and elevated with iris scissors. Meticulous hemostasis was obtained. The advancing wound edges were then meticulously re-approximated and sewed first with deep sutures and then with superficial sutures.
Bilobed Transposition Flap Text: All other repair options were considered including secondary intention healing, linear closure, and grafting. Because of the size, depth, and location of the defect, it was decided that an adjacent tissue transfer repair is the most ideal reconstruction option at this time.\\n\\nThe risks, complications, and alternatives to a Transposition Flap were discussed in detail with the patient who verbalized understanding. Risks explained include but are not limited to permanent scar, bleeding, infection, nerve injury, asymmetry, muscle weakness, cosmetic change, wound dehiscence, ecchymosis, swelling, pain, hematoma and the need for additional procedures.\\n\\nA Flap was designed as follows: \\nType of Flap: Transposition Flap, Flap Subtype: Bilobed Flap\\n\\nUsing a sterile surgical marker, an appropriate Transposition Flap was drawn incorporating the defect and placing the expected incisions within the relaxed skin tension lines where possible. Care was taken in the design of this transposition flap to have scar camouflage by placing scar lines in the borders of cosmetic units and within dynamic and static rhytids. Care was taken to avoid disruption of the adjacent free margins and to preserve function.\\n\\nThe surgical site was scrubbed with the surgical preparatory solution and draped with a sterile fenestrated drape. Throughout the procedure, the patient was repeatedly instructed to let us know should any pain or discomfort occur.\\n\\nThe defect edges were debeveled. The skin margins were undermined to an appropriate distance in all directions utilizing iris scissors. The area thus outlined was incised deep to adipose tissue with a #15 scalpel blade and elevated with iris scissors. Meticulous hemostasis was obtained. The advancing wound edges were then meticulously re-approximated and sewed first with deep sutures and then with superficial sutures.
Trilobed Flap Text: All other repair options were considered including secondary intention healing, linear closure, and grafting. Because of the size, depth, and location of the defect, it was decided that an adjacent tissue transfer repair is the most ideal reconstruction option at this time. \\n\\nThe risks, complications, and alternatives to a Transposition Flap were discussed in detail with the patient who verbalized understanding. Risks explained include but are not limited to permanent scar, bleeding, infection, nerve injury, asymmetry, muscle weakness, cosmetic change, wound dehiscence, ecchymosis, swelling, pain, hematoma and the need for additional procedures. \\n\\nA Flap was designed as follows: \\nType of Flap: Transposition Flap, Flap Subtype: Trilobed Flap\\n\\nUsing a sterile surgical marker, an appropriate Transposition Flap was drawn incorporating the defect and placing the expected incisions within the relaxed skin tension lines where possible. Care was taken in the design of this transposition flap to have scar camouflage by placing scar lines in the borders of cosmetic units and within dynamic and static rhytids. Care was taken to avoid disruption of the adjacent free margins and to preserve function. \\n\\nThe surgical site was scrubbed with the surgical preparatory solution and draped with a sterile fenestrated drape. Throughout the procedure, the patient was repeatedly instructed to let us know should any pain or discomfort occur. \\n\\nThe defect edges were debeveled. The skin margins were undermined to an appropriate distance in all directions utilizing iris scissors. The area thus outlined was incised deep to adipose tissue with a #15 scalpel blade and elevated with iris scissors. \\nMeticulous hemostasis was obtained. The advancing wound edges were then meticulously re-approximated and sewed first with deep sutures and then with superficial sutures.
Dorsal Nasal Flap Text: The defect edges were debeveled with a #15 scalpel blade. Given the location of the defect and the proximity to free margins a dorsal nasal flap was deemed most appropriate. Using a sterile surgical marker, an appropriate dorsal nasal flap was drawn around the defect. The area thus outlined was incised deep to adipose tissue with a #15 scalpel blade. The skin margins were undermined to an appropriate distance in all directions utilizing iris scissors.
Island Pedicle Flap Text: The defect edges were debeveled with a #15 scalpel blade. Given the location of the defect, shape of the defect and the proximity to free margins an island pedicle advancement flap was deemed most appropriate. Using a sterile surgical marker, an appropriate advancement flap was drawn incorporating the defect, outlining the appropriate donor tissue and placing the expected incisions within the relaxed skin tension lines where possible. The area thus outlined was incised deep to adipose tissue with a #15 scalpel blade. The skin margins were undermined to an appropriate distance in all directions around the primary defect and laterally outward around the island pedicle utilizing iris scissors. There was minimal undermining beneath the pedicle flap.
Island Pedicle Flap With Canthal Suspension Text: The defect edges were debeveled with a #15 scalpel blade. Given the location of the defect, shape of the defect and the proximity to free margins an island pedicle advancement flap was deemed most appropriate. Using a sterile surgical marker, an appropriate advancement flap was drawn incorporating the defect, outlining the appropriate donor tissue and placing the expected incisions within the relaxed skin tension lines where possible. The area thus outlined was incised deep to adipose tissue with a #15 scalpel blade. The skin margins were undermined to an appropriate distance in all directions around the primary defect and laterally outward around the island pedicle utilizing iris scissors. There was minimal undermining beneath the pedicle flap. A suspension suture was placed in the canthal tendon to prevent tension and prevent ectropion.
Alar Island Pedicle Flap Text: The defect edges were debeveled with a #15 scalpel blade. Given the location of the defect, shape of the defect and the proximity to the alar rim an island pedicle advancement flap was deemed most appropriate. Using a sterile surgical marker, an appropriate advancement flap was drawn incorporating the defect, outlining the appropriate donor tissue and placing the expected incisions within the nasal ala running parallel to the alar rim. The area thus outlined was incised with a #15 scalpel blade. The skin margins were undermined minimally to an appropriate distance in all directions around the primary defect and laterally outward around the island pedicle utilizing iris scissors. There was minimal undermining beneath the pedicle flap.
Double Island Pedicle Flap Text: The defect edges were debeveled with a #15 scalpel blade. Given the location of the defect, shape of the defect and the proximity to free margins a double island pedicle advancement flap was deemed most appropriate. Using a sterile surgical marker, an appropriate advancement flap was drawn incorporating the defect, outlining the appropriate donor tissue and placing the expected incisions within the relaxed skin tension lines where possible. The area thus outlined was incised deep to adipose tissue with a #15 scalpel blade. The skin margins were undermined to an appropriate distance in all directions around the primary defect and laterally outward around the island pedicle utilizing iris scissors. There was minimal undermining beneath the pedicle flap.
Island Pedicle Flap-Requiring Vessel Identification Text: The defect edges were debeveled with a #15 scalpel blade. Given the location of the defect, shape of the defect and the proximity to free margins an island pedicle advancement flap was deemed most appropriate. Using a sterile surgical marker, an appropriate advancement flap was drawn, based on the axial vessel mentioned above, incorporating the defect, outlining the appropriate donor tissue and placing the expected incisions within the relaxed skin tension lines where possible. The area thus outlined was incised deep to adipose tissue with a #15 scalpel blade. The skin margins were undermined to an appropriate distance in all directions around the primary defect and laterally outward around the island pedicle utilizing iris scissors. There was minimal undermining beneath the pedicle flap.
Keystone Flap Text: The defect edges were debeveled with a #15 scalpel blade. Given the location of the defect, shape of the defect a keystone flap was deemed most appropriate. Using a sterile surgical marker, an appropriate keystone flap was drawn incorporating the defect, outlining the appropriate donor tissue and placing the expected incisions within the relaxed skin tension lines where possible. The area thus outlined was incised deep to adipose tissue with a #15 scalpel blade. The skin margins were undermined to an appropriate distance in all directions around the primary defect and laterally outward around the flap utilizing iris scissors.
O-T Plasty Text: The defect edges were debeveled with a #15 scalpel blade. Given the location of the defect, shape of the defect and the proximity to free margins an O-T plasty was deemed most appropriate. Using a sterile surgical marker, an appropriate O-T plasty was drawn incorporating the defect and placing the expected incisions within the relaxed skin tension lines where possible. The area thus outlined was incised deep to adipose tissue with a #15 scalpel blade. The skin margins were undermined to an appropriate distance in all directions utilizing iris scissors.
O-Z Plasty Text: The defect edges were debeveled with a #15 scalpel blade. Given the location of the defect, shape of the defect and the proximity to free margins an O-Z plasty (double transposition flap) was deemed most appropriate. Using a sterile surgical marker, the appropriate transposition flaps were drawn incorporating the defect and placing the expected incisions within the relaxed skin tension lines where possible. The area thus outlined was incised deep to adipose tissue with a #15 scalpel blade. The skin margins were undermined to an appropriate distance in all directions utilizing iris scissors. Hemostasis was achieved with electrocautery. The flaps were then transposed into place, one clockwise and the other counterclockwise, and anchored with interrupted buried subcutaneous sutures.
Double O-Z Plasty Text: The defect edges were debeveled with a #15 scalpel blade. Given the location of the defect, shape of the defect and the proximity to free margins a Double O-Z plasty (double transposition flap) was deemed most appropriate. Using a sterile surgical marker, the appropriate transposition flaps were drawn incorporating the defect and placing the expected incisions within the relaxed skin tension lines where possible. The area thus outlined was incised deep to adipose tissue with a #15 scalpel blade. The skin margins were undermined to an appropriate distance in all directions utilizing iris scissors. Hemostasis was achieved with electrocautery. The flaps were then transposed into place, one clockwise and the other counterclockwise, and anchored with interrupted buried subcutaneous sutures.
V-Y Plasty Text: The defect edges were debeveled with a #15 scalpel blade. Given the location of the defect, shape of the defect and the proximity to free margins an V-Y advancement flap was deemed most appropriate. Using a sterile surgical marker, an appropriate advancement flap was drawn incorporating the defect and placing the expected incisions within the relaxed skin tension lines where possible. The area thus outlined was incised deep to adipose tissue with a #15 scalpel blade. The skin margins were undermined to an appropriate distance in all directions utilizing iris scissors.
H Plasty Text: Given the location of the defect, shape of the defect and the proximity to free margins a H-plasty was deemed most appropriate for repair. Using a sterile surgical marker, the appropriate advancement arms of the H-plasty were drawn incorporating the defect and placing the expected incisions within the relaxed skin tension lines where possible. The area thus outlined was incised deep to adipose tissue with a #15 scalpel blade. The skin margins were undermined to an appropriate distance in all directions utilizing iris scissors. The opposing advancement arms were then advanced into place in opposite direction and anchored with interrupted buried subcutaneous sutures.
W Plasty Text: The lesion was extirpated to the level of the fat with a #15 scalpel blade. Given the location of the defect, shape of the defect and the proximity to free margins a W-plasty was deemed most appropriate for repair. Using a sterile surgical marker, the appropriate transposition arms of the W-plasty were drawn incorporating the defect and placing the expected incisions within the relaxed skin tension lines where possible. The area thus outlined was incised deep to adipose tissue with a #15 scalpel blade. The skin margins were undermined to an appropriate distance in all directions utilizing iris scissors. The opposing transposition arms were then transposed into place in opposite direction and anchored with interrupted buried subcutaneous sutures.
Z Plasty Text: The lesion was extirpated to the level of the fat with a #15 scalpel blade. Given the location of the defect, shape of the defect and the proximity to free margins a Z-plasty was deemed most appropriate for repair. Using a sterile surgical marker, the appropriate transposition arms of the Z-plasty were drawn incorporating the defect and placing the expected incisions within the relaxed skin tension lines where possible. The area thus outlined was incised deep to adipose tissue with a #15 scalpel blade. The skin margins were undermined to an appropriate distance in all directions utilizing iris scissors. The opposing transposition arms were then transposed into place in opposite direction and anchored with interrupted buried subcutaneous sutures.
Zygomaticofacial Flap Text: Given the location of the defect, shape of the defect and the proximity to free margins a zygomaticofacial flap was deemed most appropriate for repair. Using a sterile surgical marker, the appropriate flap was drawn incorporating the defect and placing the expected incisions within the relaxed skin tension lines where possible. The area thus outlined was incised deep to adipose tissue with a #15 scalpel blade with preservation of a vascular pedicle. The skin margins were undermined to an appropriate distance in all directions utilizing iris scissors. The flap was then placed into the defect and anchored with interrupted buried subcutaneous sutures.
Cheek Interpolation Flap Text: A decision was made to reconstruct the defect utilizing an interpolation axial flap and a staged reconstruction. A telfa template was made of the defect. This telfa template was then used to outline the Cheek Interpolation flap. The donor area for the pedicle flap was then injected with anesthesia. The flap was excised through the skin and subcutaneous tissue down to the layer of the underlying musculature. The interpolation flap was carefully excised within this deep plane to maintain its blood supply. The edges of the donor site were undermined. The donor site was closed in a primary fashion. The pedicle was then rotated into position and sutured. Once the tube was sutured into place, adequate blood supply was confirmed with blanching and refill. The pedicle was then wrapped with xeroform gauze and dressed appropriately with a telfa and gauze bandage to ensure continued blood supply and protect the attached pedicle.
Cheek-To-Nose Interpolation Flap Text: A decision was made to reconstruct the defect utilizing an interpolation axial flap and a staged reconstruction. A telfa template was made of the defect. This telfa template was then used to outline the Cheek-To-Nose Interpolation flap. The donor area for the pedicle flap was then injected with anesthesia. The flap was excised through the skin and subcutaneous tissue down to the layer of the underlying musculature. The interpolation flap was carefully excised within this deep plane to maintain its blood supply. The edges of the donor site were undermined. The donor site was closed in a primary fashion. The pedicle was then rotated into position and sutured. Once the tube was sutured into place, adequate blood supply was confirmed with blanching and refill. The pedicle was then wrapped with xeroform gauze and dressed appropriately with a telfa and gauze bandage to ensure continued blood supply and protect the attached pedicle.
Interpolation Flap Text: A decision was made to reconstruct the defect utilizing an interpolation axial flap and a staged reconstruction. A telfa template was made of the defect. This telfa template was then used to outline the interpolation flap. The donor area for the pedicle flap was then injected with anesthesia. The flap was excised through the skin and subcutaneous tissue down to the layer of the underlying musculature. The interpolation flap was carefully excised within this deep plane to maintain its blood supply. The edges of the donor site were undermined. The donor site was closed in a primary fashion. The pedicle was then rotated into position and sutured. Once the tube was sutured into place, adequate blood supply was confirmed with blanching and refill. The pedicle was then wrapped with xeroform gauze and dressed appropriately with a telfa and gauze bandage to ensure continued blood supply and protect the attached pedicle.
Melolabial Interpolation Flap Text: A decision was made to reconstruct the defect utilizing an interpolation axial flap and a staged reconstruction. A telfa template was made of the defect. This telfa template was then used to outline the melolabial interpolation flap. The donor area for the pedicle flap was then injected with anesthesia. The flap was excised through the skin and subcutaneous tissue down to the layer of the underlying musculature. The pedicle flap was carefully excised within this deep plane to maintain its blood supply. The edges of the donor site were undermined. The donor site was closed in a primary fashion. The pedicle was then rotated into position and sutured. Once the tube was sutured into place, adequate blood supply was confirmed with blanching and refill. The pedicle was then wrapped with xeroform gauze and dressed appropriately with a telfa and gauze bandage to ensure continued blood supply and protect the attached pedicle.
Mastoid Interpolation Flap Text: A decision was made to reconstruct the defect utilizing an interpolation axial flap and a staged reconstruction. A telfa template was made of the defect. This telfa template was then used to outline the mastoid interpolation flap. The donor area for the pedicle flap was then injected with anesthesia. The flap was excised through the skin and subcutaneous tissue down to the layer of the underlying musculature. The pedicle flap was carefully excised within this deep plane to maintain its blood supply. The edges of the donor site were undermined. The donor site was closed in a primary fashion. The pedicle was then rotated into position and sutured. Once the tube was sutured into place, adequate blood supply was confirmed with blanching and refill. The pedicle was then wrapped with xeroform gauze and dressed appropriately with a telfa and gauze bandage to ensure continued blood supply and protect the attached pedicle.
Posterior Auricular Interpolation Flap Text: A decision was made to reconstruct the defect utilizing an interpolation axial flap and a staged reconstruction. A telfa template was made of the defect. This telfa template was then used to outline the posterior auricular interpolation flap. The donor area for the pedicle flap was then injected with anesthesia. The flap was excised through the skin and subcutaneous tissue down to the layer of the underlying musculature. The pedicle flap was carefully excised within this deep plane to maintain its blood supply. The edges of the donor site were undermined. The donor site was closed in a primary fashion. The pedicle was then rotated into position and sutured. Once the tube was sutured into place, adequate blood supply was confirmed with blanching and refill. The pedicle was then wrapped with xeroform gauze and dressed appropriately with a telfa and gauze bandage to ensure continued blood supply and protect the attached pedicle.
Paramedian Forehead Flap Text: A decision was made to reconstruct the defect utilizing an interpolation axial flap and a staged reconstruction. A telfa template was made of the defect. This telfa template was then used to outline the paramedian forehead pedicle flap. The donor area for the pedicle flap was then injected with anesthesia. The flap was excised through the skin and subcutaneous tissue down to the layer of the underlying musculature. The pedicle flap was carefully excised within this deep plane to maintain its blood supply. The edges of the donor site were undermined. The donor site was closed in a primary fashion. The pedicle was then rotated into position and sutured. Once the tube was sutured into place, adequate blood supply was confirmed with blanching and refill. The pedicle was then wrapped with xeroform gauze and dressed appropriately with a telfa and gauze bandage to ensure continued blood supply and protect the attached pedicle.
Abbe Flap (Upper To Lower Lip) Text: The defect of the lower lip was assessed and measured. Given the location and size of the defect, an Abbe flap was deemed most appropriate. Using a sterile surgical marker, an appropriate Abbe flap was measured and drawn on the upper lip. Local anesthesia was then infiltrated. A scalpel was then used to incise the upper lip through and through the skin, vermilion, muscle and mucosa, leaving the flap pedicled on the opposite side. The flap was then rotated and transferred to the lower lip defect. The flap was then sutured into place with a three layer technique, closing the orbicularis oris muscle layer with subcutaneous buried sutures, followed by a mucosal layer and an epidermal layer.
Abbe Flap (Lower To Upper Lip) Text: The defect of the upper lip was assessed and measured. Given the location and size of the defect, an Abbe flap was deemed most appropriate. Using a sterile surgical marker, an appropriate Abbe flap was measured and drawn on the lower lip. Local anesthesia was then infiltrated. A scalpel was then used to incise the upper lip through and through the skin, vermilion, muscle and mucosa, leaving the flap pedicled on the opposite side. The flap was then rotated and transferred to the lower lip defect. The flap was then sutured into place with a three layer technique, closing the orbicularis oris muscle layer with subcutaneous buried sutures, followed by a mucosal layer and an epidermal layer.
Estlander Flap (Upper To Lower Lip) Text: The defect of the lower lip was assessed and measured. Given the location and size of the defect, an Estlander flap was deemed most appropriate. Using a sterile surgical marker, an appropriate Estlander flap was measured and drawn on the upper lip. Local anesthesia was then infiltrated. A scalpel was then used to incise the lateral aspect of the flap, through skin, muscle and mucosa, leaving the flap pedicled medially. The flap was then rotated and positioned to fill the lower lip defect. The flap was then sutured into place with a three layer technique, closing the orbicularis oris muscle layer with subcutaneous buried sutures, followed by a mucosal layer and an epidermal layer.
Estlander Flap (Lower To Upper Lip) Text: The defect of the lower lip was assessed and measured.  Given the location and size of the defect, an Estlander flap was deemed most appropriate.  Using a sterile surgical marker, an appropriate Estlander flap was measured and drawn on the upper lip. Local anesthesia was then infiltrated. A scalpel was then used to incise the lateral aspect of the flap, through skin, muscle and mucosa, leaving the flap pedicled medially.  The flap was then rotated and positioned to fill the lower lip defect.  The flap was then sutured into place with a three layer technique, closing the orbicularis oris muscle layer with subcutaneous buried sutures, followed by a mucosal layer and an epidermal layer.
Cheiloplasty (Less Than 50%) Text: A decision was made to reconstruct the defect with a  cheiloplasty. The defect was undermined extensively. Additional obicularis oris muscle was excised with a 15 blade scalpel. The defect was converted into a full thickness wedge, of less than 50% of the vertical height of the lip, to facilite a better cosmetic result. Small vessels were then tied off with 5-0 monocyrl. The obicularis oris, superficial fascia, adipose and dermis were then reapproximated. After the deeper layers were approximated the epidermis was reapproximated with particular care given to realign the vermilion border.
Cheiloplasty (Complex) Text: A decision was made to reconstruct the defect with a  cheiloplasty. The defect was undermined extensively. Additional obicularis oris muscle was excised with a 15 blade scalpel. The defect was converted into a full thickness wedge to facilite a better cosmetic result. Small vessels were then tied off with 5-0 monocyrl. The obicularis oris, superficial fascia, adipose and dermis were then reapproximated. After the deeper layers were approximated the epidermis was reapproximated with particular care given to realign the vermilion border.
Ear Wedge Repair Text: A wedge excision was completed by carrying down an excision through the full thickness of the ear and cartilage with an inward facing Burow's triangle. The wound was then closed in a layered fashion.
Full Thickness Lip Wedge Repair (Flap) Text: Given the location of the defect and the proximity to free margins a full thickness wedge repair was deemed most appropriate. Using a sterile surgical marker, the appropriate repair was drawn incorporating the defect and placing the expected incisions perpendicular to the vermilion border. The vermilion border was also meticulously outlined to ensure appropriate reapproximation during the repair. The area thus outlined was incised through and through with a #15 scalpel blade. The muscularis and dermis were reaproximated with deep sutures following hemostasis. Care was taken to realign the vermilion border before proceeding with the superficial closure. Once the vermilion was realigned the superfical and mucosal closure was finished.
Ftsg Text: All other repair options were considered including secondary intention healing, linear closure, and adjacent tissue transfer. Because of the size, depth, and location of the defect, it was decided that a full thickness skin graft was the best reconstruction option at this time. \\n\\nThe risks, complications, and alternatives to a full thickness skin graft were discussed in detail with the patient who verbalized understanding. Risks explained include but are not limited to permanent scar, bleeding, infection, nerve injury, asymmetry, muscle weakness, cosmetic change, wound dehiscence, ecchymosis, swelling, pain, hematoma and the need for additional procedures. \\n\\nThe size of the donor skin to be harvested was carefully measured. Using a sterile surgical marker, the primary defect shape was transferred to the donor site. The surgical site was scrubbed with the surgical preparatory solution and draped with a sterile fenestrated drape. Throughout the procedure, the patient was repeatedly instructed to let us know should any pain or discomfort occur. \\n\\nThe area thus outlined was incised deep to adipose tissue with a #15 scalpel blade. An ellipse of tissue was harvested in the subdermal plane and placed in sterile saline. Wound edges were widely undermined. Meticulous hemostasis was obtained. The secondary defect was then meticulously re-approximated and sewed first with deep sutures and then with superficial sutures. \\n\\nThe harvested graft was then trimmed of adipose tissue until only dermis and epidermis was left. The skin graft was then placed in the primary defect and oriented appropriately.
Split-Thickness Skin Graft Text: The defect edges were debeveled with a #15 scalpel blade. Given the location of the defect, shape of the defect and the proximity to free margins a split thickness skin graft was deemed most appropriate. Using a sterile surgical marker, the primary defect shape was transferred to the donor site. The split thickness graft was then harvested. The skin graft was then placed in the primary defect and oriented appropriately.
Burow's Graft Text: All other repair options were considered including secondary intention healing, linear closure, and adjacent tissue transfer. Because of the size, depth, and location of the defect, it was decided that a Burow's full thickness skin graft was the best reconstruction option at this time. \\n\\nThe risks, complications, and alternatives to a full thickness skin graft were discussed in detail with the patient who verbalized understanding. Risks explained include but are not limited to permanent scar, bleeding, infection, nerve injury, asymmetry, muscle weakness, cosmetic change, wound dehiscence, ecchymosis, swelling, pain, hematoma and the need for additional procedures. \\n\\nUsing a sterile surgical marker, an appropriate Burow's FTSG was designed incorporating the defect and placing the expected incisions within the relaxed skin tension lines where possible. The size of the donor skin to be harvested was carefully measured and the primary defect shape was transferred to fit within the designed Burow's triangle. \\n\\nThe surgical site was scrubbed with the surgical preparatory solution and draped with a sterile fenestrated drape. Throughout the procedure, the patient was repeatedly instructed to let us know should any pain or discomfort occur. \\n\\nThe defect edges were debeveled with iris scissors. The area thus outlined was incised deep to adipose tissue with a #15 scalpel blade. The standing cone was removed and this tissue was then placed in sterile saline. Wound edges were widely undermined. Meticulous hemostasis was obtained. The secondary defect was then meticulously re-approximated and sewed first with deep sutures and then with superficial sutures. \\n\\nThe harvested graft was then trimmed to fit the size of the primary defect. The graft was trimmed of adipose tissue until only dermis and epidermis was left. The skin graft was then placed in the primary defect and oriented appropriately.
Cartilage Graft Text: All other repair options were considered including secondary intention healing, linear closure, and adjacent tissue transfer. Because of the size, depth, and location of the defect, it was decided that a cartilage strut would need to be utilized to provide structural support to the surgical defect. This was the best reconstruction option at this time. \\n\\nThe risks, complications, and alternatives to a cartilage graft were discussed in detail with the patient who verbalized understanding. Risks explained include but are not limited to permanent scar, bleeding, infection, nerve injury, asymmetry, muscle weakness, cosmetic change, wound dehiscence, ecchymosis, swelling, pain, hematoma and the need for additional procedures. \\n\\nThe size of the donor cartilage to be harvested was carefully measured and outlined with a sterile surgical marker. The surgical site was scrubbed with the surgical preparatory solution and draped with a sterile fenestrated drape. Throughout the procedure, the patient was repeatedly instructed to let us know should any pain or discomfort occur. \\n\\nAn ellipse of overlying skin was removed and a cartilage strut was harvested. Meticulous hemostasis was obtained. Repair of the donor site was accomplished with accomplished with superficial sutures. \\n\\nThe cartilage graft was then placed within the defect within a pocket created for each end. It was stabilized with several simple interrupted sutures with 5-0 Monocryl. The wound was left open for the next procedure.
Composite Graft Text: The defect edges were debeveled with a #15 scalpel blade. Given the location of the defect, shape of the defect, the proximity to free margins and the fact the defect was full thickness a composite graft was deemed most appropriate. The defect was outline and then transferred to the donor site. A full thickness graft was then excised from the donor site. The graft was then placed in the primary defect, oriented appropriately and then sutured into place. The secondary defect was then repaired using a primary closure.
Epidermal Autograft Text: The defect edges were debeveled with a #15 scalpel blade. Given the location of the defect, shape of the defect and the proximity to free margins an epidermal autograft was deemed most appropriate. Using a sterile surgical marker, the primary defect shape was transferred to the donor site. The epidermal graft was then harvested. The skin graft was then placed in the primary defect and oriented appropriately.
Dermal Autograft Text: The defect edges were debeveled with a #15 scalpel blade. Given the location of the defect, shape of the defect and the proximity to free margins a dermal autograft was deemed most appropriate. Using a sterile surgical marker, the primary defect shape was transferred to the donor site. The area thus outlined was incised deep to adipose tissue with a #15 scalpel blade. The harvested graft was then trimmed of adipose and epidermal tissue until only dermis was left. The skin graft was then placed in the primary defect and oriented appropriately.
Skin Substitute Text: The defect edges were debeveled with a #15 scalpel blade. Given the location of the defect, shape of the defect and the proximity to free margins a skin substitute graft was deemed most appropriate. The graft material was trimmed to fit the size of the defect. The graft was then placed in the primary defect and oriented appropriately.
Tissue Cultured Epidermal Autograft Text: The defect edges were debeveled with a #15 scalpel blade. Given the location of the defect, shape of the defect and the proximity to free margins a tissue cultured epidermal autograft was deemed most appropriate. The graft was then trimmed to fit the size of the defect. The graft was then placed in the primary defect and oriented appropriately.
Xenograft Text: The defect edges were debeveled with a #15 scalpel blade. Given the location of the defect, shape of the defect and the proximity to free margins a xenograft was deemed most appropriate. The graft was then trimmed to fit the size of the defect. The graft was then placed in the primary defect and oriented appropriately.
Purse String (Simple) Text: Given the location of the defect and the characteristics of the surrounding skin a pursestring closure was deemed most appropriate. Undermining was performed circumfirentially around the surgical defect. A purstring suture was then placed and tightened.
Purse String (Intermediate) Text: Given the location of the defect and the characteristics of the surrounding skin a pursestring intermediate closure was deemed most appropriate. Undermining was performed circumfirentially around the surgical defect. A purstring suture was then placed and tightened.
Partial Purse String (Simple) Text: Given the location of the defect and the characteristics of the surrounding skin a simple purse string closure was deemed most appropriate. Undermining was performed circumfirentially around the surgical defect. A purse string suture was then placed and tightened. Wound tension only allowed a partial closure of the circular defect.
Partial Purse String (Intermediate) Text: Given the location of the defect and the characteristics of the surrounding skin an intermediate purse string closure was deemed most appropriate. Undermining was performed circumfirentially around the surgical defect. A purse string suture was then placed and tightened. Wound tension only allowed a partial closure of the circular defect.
Localized Dermabrasion Text: The patient was draped in routine manner. Localized dermabrasion using 3 x 17 mm wire brush was performed in routine manner to papillary dermis. This spot dermabrasion is being performed to complete skin cancer reconstruction. It also will eliminate the other sun damaged precancerous cells that are known to be part of the regional effect of a lifetime's worth of sun exposure. This localized dermabrasion is therapeutic and should not be considered cosmetic in any regard.
Tarsorrhaphy Text: A tarsorrhaphy was performed using Frost sutures.
Intermediate Repair And Flap Additional Text (Will Appearing After The Standard Complex Repair Text): The intermediate repair was not sufficient to completely close the primary defect. The remaining additional defect was repaired with the flap mentioned below.
Intermediate Repair And Graft Additional Text (Will Appearing After The Standard Complex Repair Text): The intermediate repair was not sufficient to completely close the primary defect. The remaining additional defect was repaired with the graft mentioned below.
Complex Repair And Flap Additional Text (Will Appearing After The Standard Complex Repair Text): The complex repair was not sufficient to completely close the primary defect. The remaining additional defect was repaired with the flap mentioned below.
Complex Repair And Graft Additional Text (Will Appearing After The Standard Complex Repair Text): The complex repair was not sufficient to completely close the primary defect. The remaining additional defect was repaired with the graft mentioned below.
Manual Repair Warning Statement: We plan on removing the manually selected variable below in favor of our much easier automatic structured text blocks found in the previous tab. We decided to do this to help make the flow better and give you the full power of structured data. Manual selection is never going to be ideal in our platform and I would encourage you to avoid using manual selection from this point on, especially since I will be sunsetting this feature. It is important that you do one of two things with the customized text below. First, you can save all of the text in a word file so you can have it for future reference. Second, transfer the text to the appropriate area in the Library tab. Lastly, if there is a flap or graft type which we do not have you need to let us know right away so I can add it in before the variable is hidden. No need to panic, we plan to give you roughly 6 months to make the change.
Same Histology In Subsequent Stages Text: The pattern and morphology of the tumor is as described in the first stage.
No Residual Tumor Seen Histology Text: There were no malignant cells seen in the sections examined.
Inflammation Suggestive Of Cancer Camouflage Histology Text: There was a dense lymphocytic infiltrate which prevented adequate histologic evaluation of adjacent structures.
Incidental Superficial Basal Cell Carcinoma Histology Text: There are nests of atypical basophilic cells present right below or budding off the epidermis with skip areas. Stromal changes and retraction artifacts are noted, consistent with a superficial basal cell carcinoma.
Incidental Squamous Cell Carcinoma In Situ Histology Text: There is full-thickness keratinocytic atypia within the epidermis consistent with squamous cell carcinoma in situ.
Incidental Actinic Keratosis Histology Text: There is atypia of the keratinocytes in the basal layer of the epidermis, consistent with an actinic keratosis.
Bcc Histology Text: Beneath an irregular epidermis, there are small nodular masses of basaloid neoplastic cells with nuclear pleomorphism attached to the basal layer and surrounded by a loose fibrous stroma and mild inflammation in the dermis. The findings are typical for a nodular basal cell carcinoma.
Bcc Infiltrative Histology Text: There are nests and cords of atypical basophilic cells present within the fibrotic dermis displaying an infiltrative pattern of invasion. The findings are typical for an infiltrative basal cell carcinoma.
Bcc Infundibulocystic Histology Text: Beneath an irregular epidermis, there are small nodular masses of basaloid neoplastic cells with nuclear pleomorphism attached to the basal layer and surrounded by a loose fibrous stroma and mild inflammation in the dermis. There are also multiple small cysts containing cornified material with differentiation towards the infundibulum. The findings are typical for an infundibulocystic basal cell carcinoma.
Bcc Keratotic Histology Text: Beneath an irregular epidermis, there are small nodular masses of basaloid neoplastic cells with nuclear pleomorphism attached to the basal layer and surrounded by a loose fibrous stroma and mild inflammation in the dermis. Areas of keratinization within the neoplastic cells are appreciated. The findings are consistent with a keratinizing basal cell carcinoma.
Bcc Micronodular Histology Text: There are nests of atypical basophilic neoplastic cells present within the fibrotic dermis displaying a micronodular pattern of invasion. The findings are typical for a micronodular basal cell carcinoma.
Bcc  Morpheaform/Sclerosing Histology Text: There are strands and cords of atypical basophilic neoplastic cells embedded in a fibrous network of connective tissue.  The appearance is typical for a morpheaform basal cell carcinoma
Bcc  Nodular Histology Text: Beneath an irregular epidermis, there are small nodular masses of basaloid neoplastic cells with nuclear pleomorphism attached to the basal layer and surrounded by a loose fibrous stroma and mild inflammation in the dermis. Stromal changes and retraction artifacts are noted. The findings are typical for a nodular basal cell carcinoma.
Bcc Superficial Histology Text: There are nests of atypical basophilic cells present right below or budding off the epidermis with skip areas. Stromal changes and retraction artifacts are noted. The findings are consistent with a superficial basal cell carcinoma.
Mixed Nodular And Infiltrative Bcc Histology Text: Beneath an irregular epidermis, there are small nodular masses of basaloid neoplastic cells with nuclear pleomorphism attached to the basal layer and surrounded by a loose fibrous stroma and mild inflammation in the dermis. Stromal changes and retraction artifacts are noted. The findings are typical for a nodular basal cell carcinoma. There are also irregular nests of pleomorphic, hyperchromatic basaloid cells with peripheral palisading infiltrate the tissue in a diffuse fashion in association with sclerotic stroma, consistent with an infiltrative basal cell carcinoma.
Scc Histology Text: There are islands of atypical squamous cells invading the dermis in addition to full thickness keratinocytic atypia within the epidermis. The findings are consistent with squamous cell carcinoma.
Scc Moderately Differentiated Histology Text: There are islands of atypical squamous cells invading the dermis in addition to full thickness keratinocytic atypia within the epidermis. The neoplastic cells demonstrate increased nuclear and cytoplasmic pleomorphism and are moderately differentiated. The findings are consistent with moderately differentiated squamous cell carcinoma.
Scc Poorly Differentiated Histology Text: There are strands and nests of pleomorphic cells present in an infiltrative pattern. Mitotic activity is brisk. There is vascular proliferation and acute and chronic inflammation in the dermis. The findings are those of a poorly differentiated squamous cell carcinoma.
Scc Acantholytic Histology Text: There are islands of atypical squamous cells invading the dermis in addition to full thickness keratinocytic atypia within the epidermis. Acantholysis of malignant epithelial cells is appreciated. The findings are consistent with an acantholytic squamous cell carcinoma.
Scc Ka Subtype Histology Text: There is a cup-shaped exoendophytic epithelial neoplasm characterized by proliferations of keratinocytes with abundant eosinophilic glossy cytoplasm and nuclei with open chromatin in the papillary and upper reticular dermis. Multiple inclusions containing elastic material are seen within the cytoplasm. The features are of squamous cell carcinoma, keratoacanthoma type.
Scc Spindle Histology Text: There are sheets of pleomorphic spindled cell areas showing no evidence of epidermal derivation or squamous differentiation. The findings are consistent with squamous cell carcinoma.
Melanoma In Situ Histology Text: On a sun-damaged skin, there is a broad and asymmetrical proliferation of enlarged and atypical melanocytes present continuously as single cells and in small irregular coalescing nests along the dermoepidermal junction. The melanocytes extend into the superficial portions of epithelial structures of adnexa. Pagetoid spread of melanocytes is appreciated. Occasional mitotic figures and individually necrotic melanocytes are also noted. In the underlying papillary dermis, there is mild lymphocytic inflammatory infiltrate with prominent dermal fibroplasia and melanophages.
Afx Histology Text: There is a poorly differentiated spindled cell neoplasm composed of fascicles of atypical spindled and epithelioid cells, infiltrating and replacing papillary and reticular dermis. Mitotic activity is brisk, including atypical forms. The lesion is present on a sun-damaged skin. This pattern supports the diagnosis of atypical fibrous xanthoma.
Mart-1 - Positive Histology Text: MART-1 staining demonstrates areas of higher density and clustering of melanocytes with Pagetoid spread upwards within the epidermis. The surgical margins are positive for tumor cells.
Mart-1 - Negative Histology Text: MART-1 staining demonstrates a normal density and pattern of melanocytes along the dermal-epidermal junction. The surgical margins are negative for tumor cells.
Information: Selecting Yes will display possible errors in your note based on the variables you have selected. This validation is only offered as a suggestion for you. PLEASE NOTE THAT THE VALIDATION TEXT WILL BE REMOVED WHEN YOU FINALIZE YOUR NOTE. IF YOU WANT TO FAX A PRELIMINARY NOTE YOU WILL NEED TO TOGGLE THIS TO 'NO' IF YOU DO NOT WANT IT IN YOUR FAXED NOTE.

## 2023-03-27 NOTE — PROCEDURE: MEDICATION COUNSELING
Azithromycin Pregnancy And Lactation Text: This medication is considered safe during pregnancy and is also secreted in breast milk.
Topical Steroids Applications Pregnancy And Lactation Text: Most topical steroids are considered safe to use during pregnancy and lactation. Any topical steroid applied to the breast or nipple should be washed off before breastfeeding.
Minoxidil Pregnancy And Lactation Text: This medication has not been assigned a Pregnancy Risk Category but animal studies failed to show danger with the topical medication. It is unknown if the medication is excreted in breast milk.
Ilumya Pregnancy And Lactation Text: The risk during pregnancy and breastfeeding is uncertain with this medication.
Zoryve Counseling:  I discussed with the patient that [place] Olp is not for use in the eyes, mouth or vagina. The most commonly reported side effects include diarrhea, headache, insomnia, application site pain, upper respiratory tract infections, and urinary tract infections. All of the patient's questions and concerns were addressed.
Tetracycline Counseling: Patient counseled regarding possible photosensitivity and increased risk for sunburn. Patient instructed to avoid sunlight, if possible. When exposed to sunlight, patients should wear protective clothing, sunglasses, and sunscreen. The patient was instructed to call the office immediately if the following severe adverse effects occur:  hearing changes, easy bruising/bleeding, severe headache, or vision changes. The patient verbalized understanding of the proper use and possible adverse effects of tetracycline. All of the patient's questions and concerns were addressed. Patient understands to avoid pregnancy while on therapy due to potential birth defects.
Isotretinoin Pregnancy And Lactation Text: This medication is Pregnancy Category X and is considered extremely dangerous during pregnancy. It is unknown if it is excreted in breast milk.
Arava Counseling:  Patient counseled regarding adverse effects of Arava including but not limited to nausea, vomiting, abnormalities in liver function tests. Patients may develop mouth sores, rash, diarrhea, and abnormalities in blood counts. The patient understands that monitoring is required including LFTs and blood counts. There is a rare possibility of scarring of the liver and lung problems that can occur when taking methotrexate. Persistent nausea, loss of appetite, pale stools, dark urine, cough, and shortness of breath should be reported immediately. Patient advised to discontinue Arava treatment and consult with a physician prior to attempting conception. The patient will have to undergo a treatment to eliminate Arava from the body prior to conception.
Propranolol Counseling:  I discussed with the patient the risks of propranolol including but not limited to low heart rate, low blood pressure, low blood sugar, restlessness and increased cold sensitivity. They should call the office if they experience any of these side effects.
Cosentyx Counseling:  I discussed with the patient the risks of Cosentyx including but not limited to worsening of Crohn's disease, immunosuppression, allergic reactions and infections. The patient understands that monitoring is required including a PPD at baseline and must alert us or the primary physician if symptoms of infection or other concerning signs are noted.
Nsaids Pregnancy And Lactation Text: These medications are considered safe up to 30 weeks gestation. It is excreted in breast milk.
Ketoconazole Pregnancy And Lactation Text: This medication is Pregnancy Category C and it isn't know if it is safe during pregnancy. It is also excreted in breast milk and breast feeding isn't recommended.
Carac Counseling:  I discussed with the patient the risks of Carac including but not limited to erythema, scaling, itching, weeping, crusting, and pain.
Azathioprine Pregnancy And Lactation Text: This medication is Pregnancy Category D and isn't considered safe during pregnancy. It is unknown if this medication is excreted in breast milk.
Metronidazole Pregnancy And Lactation Text: This medication is Pregnancy Category B and considered safe during pregnancy. It is also excreted in breast milk.
Eucrisa Counseling: Patient may experience a mild burning sensation during topical application. Isidore [place] is not approved in children less than 3years of age.
Cephalexin Counseling: I counseled the patient regarding use of cephalexin as an antibiotic for prophylactic and/or therapeutic purposes. Cephalexin (commonly prescribed under brand name Keflex) is a cephalosporin antibiotic which is active against numerous classes of bacteria, including most skin bacteria. Side effects may include nausea, diarrhea, gastrointestinal upset, rash, hives, yeast infections, and in rare cases, hepatitis, kidney disease, seizures, fever, confusion, neurologic symptoms, and others. Patients with severe allergies to penicillin medications are cautioned that there is about a 10% incidence of cross-reactivity with cephalosporins. When possible, patients with penicillin allergies should use alternatives to cephalosporins for antibiotic therapy.
Calcipotriene Pregnancy And Lactation Text: This medication has not been proven safe during pregnancy. It is unknown if this medication is excreted in breast milk.
Prednisone Counseling:  I discussed with the patient the risks of prolonged use of prednisone including but not limited to weight gain, insomnia, osteoporosis, mood changes, diabetes, susceptibility to infection, glaucoma and high blood pressure. In cases where prednisone use is prolonged, patients should be monitored with blood pressure checks, serum glucose levels and an eye exam.  Additionally, the patient may need to be placed on GI prophylaxis, PCP prophylaxis, and calcium and vitamin D supplementation and/or a bisphosphonate. The patient verbalized understanding of the proper use and the possible adverse effects of prednisone. All of the patient's questions and concerns were addressed.
Tazorac Counseling:  Patient advised that medication is irritating and drying. Patient may need to apply sparingly and wash off after an hour before eventually leaving it on overnight. The patient verbalized understanding of the proper use and possible adverse effects of tazorac. All of the patient's questions and concerns were addressed.
Dutasteride Counseling: Dustasteride Counseling:  I discussed with the patient the risks of use of dutasteride including but not limited to decreased libido, decreased ejaculate volume, and gynecomastia. Women who can become pregnant should not handle medication. All of the patient's questions and concerns were addressed.
Sotyktu Pregnancy And Lactation Text: There is insufficient data to evaluate whether or not Sotyktu is safe to use during pregnancy. ? ? It is not known if Sotyktu passes into breast milk and whether or not it is safe to use when breastfeeding. ??
Glycopyrrolate Counseling:  I discussed with the patient the risks of glycopyrrolate including but not limited to skin rash, drowsiness, dry mouth, difficulty urinating, and blurred vision.
Propranolol Pregnancy And Lactation Text: This medication is Pregnancy Category C and it isn't known if it is safe during pregnancy. It is excreted in breast milk.
Zoryve Pregnancy And Lactation Text: It is unknown if this medication can cause problems during pregnancy and breastfeeding.
Albendazole Counseling:  I discussed with the patient the risks of albendazole including but not limited to cytopenia, kidney damage, nausea/vomiting and severe allergy. The patient understands that this medication is being used in an off-label manner.
Arava Pregnancy And Lactation Text: This medication is Pregnancy Category X and is absolutely contraindicated during pregnancy. It is unknown if it is excreted in breast milk.
Xolair Counseling:  Patient informed of potential adverse effects including but not limited to fever, muscle aches, rash and allergic reactions. The patient verbalized understanding of the proper use and possible adverse effects of Xolair. All of the patient's questions and concerns were addressed.
High Dose Vitamin A Counseling: Side effects reviewed, pt to contact office should one occur.
Use Enhanced Medication Counseling?: No
Qbrexza Pregnancy And Lactation Text: There is no available data on Qbrexza use in pregnant women. There is no available data on Qbrexza use in lactation.
Infliximab Counseling:  I discussed with the patient the risks of infliximab including but not limited to myelosuppression, immunosuppression, autoimmune hepatitis, demyelinating diseases, lymphoma, and serious infections. The patient understands that monitoring is required including a PPD at baseline and must alert us or the primary physician if symptoms of infection or other concerning signs are noted.
Mirvaso Counseling: Enrigue Jean Baptiste is a topical medication which can decrease superficial blood flow where applied. Side effects are uncommon and include stinging, redness and allergic reactions.
Tetracycline Pregnancy And Lactation Text: This medication is Pregnancy Category D and not consider safe during pregnancy. It is also excreted in breast milk.
Cosentyx Pregnancy And Lactation Text: This medication is Pregnancy Category B and is considered safe during pregnancy. It is unknown if this medication is excreted in breast milk.
Bactrim Counseling:  I discussed with the patient the risks of sulfa antibiotics including but not limited to GI upset, allergic reaction, drug rash, diarrhea, dizziness, photosensitivity, and yeast infections. Rarely, more serious reactions can occur including but not limited to aplastic anemia, agranulocytosis, methemoglobinemia, blood dyscrasias, liver or kidney failure, lung infiltrates or desquamative/blistering drug rashes.
Topical Sulfur Applications Counseling: Topical Sulfur Counseling: Patient counseled that this medication may cause skin irritation or allergic reactions. In the event of skin irritation, the patient was advised to reduce the amount of the drug applied or use it less frequently. The patient verbalized understanding of the proper use and possible adverse effects of topical sulfur application. All of the patient's questions and concerns were addressed.
Tazorac Pregnancy And Lactation Text: This medication is not safe during pregnancy. It is unknown if this medication is excreted in breast milk.
Dutasteride Pregnancy And Lactation Text: This medication is absolutely contraindicated in women, especially during pregnancy and breast feeding. Feminization of male fetuses is possible if taking while pregnant.
Terbinafine Counseling: Patient counseling regarding adverse effects of terbinafine including but not limited to headache, diarrhea, rash, upset stomach, liver function test abnormalities, itching, taste/smell disturbance, nausea, abdominal pain, and flatulence. There is a rare possibility of liver failure that can occur when taking terbinafine. The patient understands that a baseline LFT and kidney function test may be required. The patient verbalized understanding of the proper use and possible adverse effects of terbinafine. All of the patient's questions and concerns were addressed.
Olanzapine Counseling- I discussed with the patient the common side effects of olanzapine including but are not limited to: lack of energy, dry mouth, increased appetite, sleepiness, tremor, constipation, dizziness, changes in behavior, or restlessness. Explained that teenagers are more likely to experience headaches, abdominal pain, pain in the arms or legs, tiredness, and sleepiness. Serious side effects include but are not limited: increased risk of death in elderly patients who are confused, have memory loss, or dementia-related psychosis; hyperglycemia; increased cholesterol and triglycerides; and weight gain.
Prednisone Pregnancy And Lactation Text: This medication is Pregnancy Category C and it isn't know if it is safe during pregnancy. This medication is excreted in breast milk.
Minocycline Counseling: Patient advised regarding possible photosensitivity and discoloration of the teeth, skin, lips, tongue and gums. Patient instructed to avoid sunlight, if possible. When exposed to sunlight, patients should wear protective clothing, sunglasses, and sunscreen. The patient was instructed to call the office immediately if the following severe adverse effects occur:  hearing changes, easy bruising/bleeding, severe headache, or vision changes. The patient verbalized understanding of the proper use and possible adverse effects of minocycline. All of the patient's questions and concerns were addressed.
Cantharidin Counseling: Calcipotriene Counseling:  I discussed with the patient the risks of calcipotriene including but not limited to erythema, scaling, itching, and irritation.
Zyclara Counseling:  I discussed with the patient the risks of imiquimod including but not limited to erythema, scaling, itching, weeping, crusting, and pain. Patient understands that the inflammatory response to imiquimod is variable from person to person and was educated regarded proper titration schedule. If flu-like symptoms develop, patient knows to discontinue the medication and contact us.
Cephalexin Pregnancy And Lactation Text: This medication is Pregnancy Category B and considered safe during pregnancy. It is also excreted in breast milk but can be used safely for shorter doses.
High Dose Vitamin A Pregnancy And Lactation Text: High dose vitamin A therapy is contraindicated during pregnancy and breast feeding.
Cellcept Counseling:  I discussed with the patient the risks of mycophenolate mofetil including but not limited to infection/immunosuppression, GI upset, hypokalemia, hypercholesterolemia, bone marrow suppression, lymphoproliferative disorders, malignancy, GI ulceration/bleed/perforation, colitis, interstitial lung disease, kidney failure, progressive multifocal leukoencephalopathy, and birth defects. The patient understands that monitoring is required including a baseline creatinine and regular CBC testing. In addition, patient must alert us immediately if symptoms of infection or other concerning signs are noted.
Rhofade Counseling: Rhofade is a topical medication which can decrease superficial blood flow where applied. Side effects are uncommon and include stinging, redness and allergic reactions.
Xolair Pregnancy And Lactation Text: This medication is Pregnancy Category B and is considered safe during pregnancy. This medication is excreted in breast milk.
Glycopyrrolate Pregnancy And Lactation Text: This medication is Pregnancy Category B and is considered safe during pregnancy. It is unknown if it is excreted breast milk.
Xeljanz Counseling: Eddye Pole Counseling: I discussed with the patient the risks of Eddye Pole therapy including increased risk of infection, liver issues, headache, diarrhea, or cold symptoms. Live vaccines should be avoided. They were instructed to call if they have any problems.
SSKI Counseling:  I discussed with the patient the risks of SSKI including but not limited to thyroid abnormalities, metallic taste, GI upset, fever, headache, acne, arthralgias, paraesthesias, lymphadenopathy, easy bleeding, arrhythmias, and allergic reaction.
Bactrim Pregnancy And Lactation Text: This medication is Pregnancy Category D and is known to cause fetal risk. It is also excreted in breast milk.
Skyrizi Counseling: I discussed with the patient the risks of risankizumab-rzaa including but not limited to immunosuppression, and serious infections. The patient understands that monitoring is required including a PPD at baseline and must alert us or the primary physician if symptoms of infection or other concerning signs are noted.
Albendazole Pregnancy And Lactation Text: This medication is Pregnancy Category C and it isn't known if it is safe during pregnancy. It is also excreted in breast milk.
Clofazimine Counseling:  I discussed with the patient the risks of clofazimine including but not limited to skin and eye pigmentation, liver damage, nausea/vomiting, gastrointestinal bleeding and allergy.
Mirvaso Pregnancy And Lactation Text: This medication has not been assigned a Pregnancy Risk Category. It is unknown if the medication is excreted in breast milk.
Topical Clindamycin Counseling: Patient counseled that this medication may cause skin irritation or allergic reactions. In the event of skin irritation, the patient was advised to reduce the amount of the drug applied or use it less frequently. The patient verbalized understanding of the proper use and possible adverse effects of clindamycin. All of the patient's questions and concerns were addressed.
Hydroquinone Counseling:  Patient advised that medication may result in skin irritation, lightening (hypopigmentation), dryness, and burning. In the event of skin irritation, the patient was advised to reduce the amount of the drug applied or use it less frequently. Rarely, spots that are treated with hydroquinone can become darker (pseudoochronosis). Should this occur, patient instructed to stop medication and call the office. The patient verbalized understanding of the proper use and possible adverse effects of hydroquinone. All of the patient's questions and concerns were addressed.
Dupixent Counseling: I discussed with the patient the risks of dupilumab including but not limited to eye infection and irritation, cold sores, injection site reactions, worsening of asthma, allergic reactions and increased risk of parasitic infection. Live vaccines should be avoided while taking dupilumab. Dupilumab will also interact with certain medications such as warfarin and cyclosporine. The patient understands that monitoring is required and they must alert us or the primary physician if symptoms of infection or other concerning signs are noted.
Topical Sulfur Applications Pregnancy And Lactation Text: This medication is Pregnancy Category C and has an unknown safety profile during pregnancy. It is unknown if this topical medication is excreted in breast milk.
Olanzapine Pregnancy And Lactation Text: This medication is pregnancy category C. There are no adequate and well controlled trials with olanzapine in pregnant females. Olanzapine should be used during pregnancy only if the potential benefit justifies the potential risk to the fetus. In a study in lactating healthy women, olanzapine was excreted in breast milk. It is recommended that women taking olanzapine should not breast feed.
Terbinafine Pregnancy And Lactation Text: This medication is Pregnancy Category B and is considered safe during pregnancy. It is also excreted in breast milk and breast feeding isn't recommended.
Xelpriscillaz Pregnancy And Lactation Text: This medication is Pregnancy Category D and is not considered safe during pregnancy. The risk during breast feeding is also uncertain.
Hydroxychloroquine Counseling:  I discussed with the patient that a baseline ophthalmologic exam is needed at the start of therapy and every year thereafter while on therapy. A CBC may also be warranted for monitoring. The side effects of this medication were discussed with the patient, including but not limited to agranulocytosis, aplastic anemia, seizures, rashes, retinopathy, and liver toxicity. Patient instructed to call the office should any adverse effect occur. The patient verbalized understanding of the proper use and possible adverse effects of Plaquenil. All the patient's questions and concerns were addressed.
Fluconazole Counseling:  Patient counseled regarding adverse effects of fluconazole including but not limited to headache, diarrhea, nausea, upset stomach, liver function test abnormalities, taste disturbance, and stomach pain. There is a rare possibility of liver failure that can occur when taking fluconazole. The patient understands that monitoring of LFTs and kidney function test may be required, especially at baseline. The patient verbalized understanding of the proper use and possible adverse effects of fluconazole. All of the patient's questions and concerns were addressed.
Finasteride Counseling:  I discussed with the patient the risks of use of finasteride including but not limited to decreased libido, decreased ejaculate volume, gynecomastia, and depression. Women should not handle medication. All of the patient's questions and concerns were addressed.
Cantharidin Pregnancy And Lactation Text: The use of this medication during pregnancy or lactation is not recommended as there is insufficient data.
Clindamycin Counseling: I counseled the patient regarding use of clindamycin as an antibiotic for prophylactic and/or therapeutic purposes. Clindamycin is active against numerous classes of bacteria, including skin bacteria. Side effects may include nausea, diarrhea, gastrointestinal upset, rash, hives, yeast infections, and in rare cases, colitis.
Rituxan Counseling:  I discussed with the patient the risks of Rituxan infusions. Side effects can include infusion reactions, severe drug rashes including mucocutaneous reactions, reactivation of latent hepatitis and other infections and rarely progressive multifocal leukoencephalopathy. All of the patient's questions and concerns were addressed.
Zyclara Pregnancy And Lactation Text: This medication is Pregnancy Category C. It is unknown if this medication is excreted in breast milk.
Cibinqo Counseling: I discussed with the patient the risks of Cibinqo therapy including but not limited to common cold, nausea, headache, cold sores, increased blood CPK levels, dizziness, UTIs, fatigue, acne, and vomitting. Live vaccines should be avoided. This medication has been linked to serious infections; higher rate of mortality; malignancy and lymphoproliferative disorders; major adverse cardiovascular events; thrombosis; thrombocytopenia and lymphopenia; lipid elevations; and retinal detachment.
Clofazimine Pregnancy And Lactation Text: This medication is Pregnancy Category C and isn't considered safe during pregnancy. It is excreted in breast milk.
Wartpeel Counseling:  I discussed with the patient the risks of Wartpeel including but not limited to erythema, scaling, itching, weeping, crusting, and pain.
Oral Minoxidil Counseling- I discussed with the patient the risks of oral minoxidil including but not limited to shortness of breath, swelling of the feet or ankles, dizziness, lightheadedness, unwanted hair growth and allergic reaction. The patient verbalized understanding of the proper use and possible adverse effects of oral minoxidil. All of the patient's questions and concerns were addressed.
Ivermectin Counseling:  Patient instructed to take medication on an empty stomach with a full glass of water. Patient informed of potential adverse effects including but not limited to nausea, diarrhea, dizziness, itching, and swelling of the extremities or lymph nodes. The patient verbalized understanding of the proper use and possible adverse effects of ivermectin. All of the patient's questions and concerns were addressed.
Sski Pregnancy And Lactation Text: This medication is Pregnancy Category D and isn't considered safe during pregnancy. It is excreted in breast milk.
Opzelura Counseling:  I discussed with the patient the risks of Emmanuelle Clarity including but not limited to nasopharngitis, bronchitis, ear infection, eosinophila, hives, diarrhea, folliculitis, tonsillitis, and rhinorrhea. Taken orally, this medication has been linked to serious infections; higher rate of mortality; malignancy and lymphoproliferative disorders; major adverse cardiovascular events; thrombosis; thrombocytopenia, anemia, and neutropenia; and lipid elevations.
Dupixent Pregnancy And Lactation Text: This medication likely crosses the placenta but the risk for the fetus is uncertain. This medication is excreted in breast milk.
Quinolones Counseling:  I discussed with the patient the risks of fluoroquinolones including but not limited to GI upset, allergic reaction, drug rash, diarrhea, dizziness, photosensitivity, yeast infections, liver function test abnormalities, tendonitis/tendon rupture.
Erivedge Counseling- I discussed with the patient the risks of Erivedge including but not limited to nausea, vomiting, diarrhea, constipation, weight loss, changes in the sense of taste, decreased appetite, muscle spasms, and hair loss. The patient verbalized understanding of the proper use and possible adverse effects of Erivedge. All of the patient's questions and concerns were addressed.
Aklief counseling:  Patient advised to apply a pea-sized amount only at bedtime and wait 30 minutes after washing their face before applying. If too drying, patient may add a non-comedogenic moisturizer. The most commonly reported side effects including irritation, redness, scaling, dryness, stinging, burning, itching, and increased risk of sunburn. The patient verbalized understanding of the proper use and possible adverse effects of retinoids. All of the patient's questions and concerns were addressed.
Topical Clindamycin Pregnancy And Lactation Text: This medication is Pregnancy Category B and is considered safe during pregnancy. It is unknown if it is excreted in breast milk.
Finasteride Pregnancy And Lactation Text: This medication is absolutely contraindicated during pregnancy. It is unknown if it is excreted in breast milk.
Cimetidine Counseling:  I discussed with the patient the risks of Cimetidine including but not limited to gynecomastia, headache, diarrhea, nausea, drowsiness, arrhythmias, pancreatitis, skin rashes, psychosis, bone marrow suppression and kidney toxicity.
Fluconazole Pregnancy And Lactation Text: This medication is Pregnancy Category C and it isn't know if it is safe during pregnancy. It is also excreted in breast milk.
Detail Level: Simple
Hydroxychloroquine Pregnancy And Lactation Text: This medication has been shown to cause fetal harm but it isn't assigned a Pregnancy Risk Category. There are small amounts excreted in breast milk.
Cyclophosphamide Counseling:  I discussed with the patient the risks of cyclophosphamide including but not limited to hair loss, hormonal abnormalities, decreased fertility, abdominal pain, diarrhea, nausea and vomiting, bone marrow suppression and infection. The patient understands that monitoring is required while taking this medication.
5-Fu Counseling: 5-Fluorouracil Counseling:  I discussed with the patient the risks of 5-fluorouracil including but not limited to erythema, scaling, itching, weeping, crusting, and pain.
Solaraze Counseling:  I discussed with the patient the risks of Solaraze including but not limited to erythema, scaling, itching, weeping, crusting, and pain.
Clindamycin Pregnancy And Lactation Text: This medication can be used in pregnancy if certain situations. Clindamycin is also present in breast milk.
Acitretin Counseling:  I discussed with the patient the risks of acitretin including but not limited to hair loss, dry lips/skin/eyes, liver damage, hyperlipidemia, depression/suicidal ideation, photosensitivity. Serious rare side effects can include but are not limited to pancreatitis, pseudotumor cerebri, bony changes, clot formation/stroke/heart attack. Patient understands that alcohol is contraindicated since it can result in liver toxicity and significantly prolong the elimination of the drug by many years.
Enbrel Counseling:  I discussed with the patient the risks of etanercept including but not limited to myelosuppression, immunosuppression, autoimmune hepatitis, demyelinating diseases, lymphoma, and infections. The patient understands that monitoring is required including a PPD at baseline and must alert us or the primary physician if symptoms of infection or other concerning signs are noted.
Wartpeel Pregnancy And Lactation Text: This medication is Pregnancy Category X and contraindicated in pregnancy and in women who may become pregnant. It is unknown if this medication is excreted in breast milk.
Oral Minoxidil Pregnancy And Lactation Text: This medication should only be used when clearly needed if you are pregnant, attempting to become pregnant or breast feeding.
Opzelura Pregnancy And Lactation Text: There is insufficient data to evaluate drug-associated risk for major birth defects, miscarriage, or other adverse maternal or fetal outcomes. There is a pregnancy registry that monitors pregnancy outcomes in pregnant persons exposed to the medication during pregnancy. It is unknown if this medication is excreted in breast milk. Do not breastfeed during treatment and for about 4 weeks after the last dose.
Rituxan Pregnancy And Lactation Text: This medication is Pregnancy Category C and it isn't know if it is safe during pregnancy. It is unknown if this medication is excreted in breast milk but similar antibodies are known to be excreted.
Cibinqo Pregnancy And Lactation Text: It is unknown if this medication will adversely affect pregnancy or breast feeding. You should not take this medication if you are currently pregnant or planning a pregnancy or while breastfeeding.
Colchicine Counseling:  Patient counseled regarding adverse effects including but not limited to stomach upset (nausea, vomiting, stomach pain, or diarrhea). Patient instructed to limit alcohol consumption while taking this medication. Colchicine may reduce blood counts especially with prolonged use. The patient understands that monitoring of kidney function and blood counts may be required, especially at baseline. The patient verbalized understanding of the proper use and possible adverse effects of colchicine. All of the patient's questions and concerns were addressed.
Thalidomide Counseling: I discussed with the patient the risks of thalidomide including but not limited to birth defects, anxiety, weakness, chest pain, dizziness, cough and severe allergy.
Stelara Counseling:  I discussed with the patient the risks of ustekinumab including but not limited to immunosuppression, malignancy, posterior leukoencephalopathy syndrome, and serious infections. The patient understands that monitoring is required including a PPD at baseline and must alert us or the primary physician if symptoms of infection or other concerning signs are noted.
Aklief Pregnancy And Lactation Text: It is unknown if this medication is safe to use during pregnancy. It is unknown if this medication is excreted in breast milk. Breastfeeding women should use the topical cream on the smallest area of the skin for the shortest time needed while breastfeeding. Do not apply to nipple and areola.
Opioid Counseling: I discussed with the patient the potential side effects of opioids including but not limited to addiction, altered mental status, and depression. I stressed avoiding alcohol, benzodiazepines, muscle relaxants and sleep aids unless specifically okayed by a physician. The patient verbalized understanding of the proper use and possible adverse effects of opioids. All of the patient's questions and concerns were addressed. They were instructed to flush the remaining pills down the toilet if they did not need them for pain.
Imiquimod Counseling:  I discussed with the patient the risks of imiquimod including but not limited to erythema, scaling, itching, weeping, crusting, and pain. Patient understands that the inflammatory response to imiquimod is variable from person to person and was educated regarded proper titration schedule. If flu-like symptoms develop, patient knows to discontinue the medication and contact us.
Cyclophosphamide Pregnancy And Lactation Text: This medication is Pregnancy Category D and it isn't considered safe during pregnancy. This medication is excreted in breast milk.
Solaraze Pregnancy And Lactation Text: This medication is Pregnancy Category B and is considered safe. There is some data to suggest avoiding during the third trimester. It is unknown if this medication is excreted in breast milk.
Doxycycline Counseling:  Patient counseled regarding possible photosensitivity and increased risk for sunburn. Patient instructed to avoid sunlight, if possible. When exposed to sunlight, patients should wear protective clothing, sunglasses, and sunscreen. The patient was instructed to call the office immediately if the following severe adverse effects occur:  hearing changes, easy bruising/bleeding, severe headache, or vision changes. The patient verbalized understanding of the proper use and possible adverse effects of doxycycline. All of the patient's questions and concerns were addressed.
Topical Ketoconazole Counseling: Patient counseled that this medication may cause skin irritation or allergic reactions. In the event of skin irritation, the patient was advised to reduce the amount of the drug applied or use it less frequently. The patient verbalized understanding of the proper use and possible adverse effects of ketoconazole. All of the patient's questions and concerns were addressed.
Low Dose Naltrexone Counseling- I discussed with the patient the potential risks and side effects of low dose naltrexone including but not limited to: more vivid dreams, headaches, nausea, vomiting, abdominal pain, fatigue, dizziness, and anxiety.
Birth Control Pills Counseling: Birth Control Pill Counseling: I discussed with the patient the potential side effects of OCPs including but not limited to increased risk of stroke, heart attack, thrombophlebitis, deep venous thrombosis, hepatic adenomas, breast changes, GI upset, headaches, and depression. The patient verbalized understanding of the proper use and possible adverse effects of OCPs. All of the patient's questions and concerns were addressed.
Griseofulvin Counseling:  I discussed with the patient the risks of griseofulvin including but not limited to photosensitivity, cytopenia, liver damage, nausea/vomiting and severe allergy. The patient understands that this medication is best absorbed when taken with a fatty meal (e.g., ice cream or french fries).
Olumiant Counseling: I discussed with the patient the risks of Olumiant therapy including but not limited to upper respiratory tract infections, shingles, cold sores, and nausea. Live vaccines should be avoided. This medication has been linked to serious infections; higher rate of mortality; malignancy and lymphoproliferative disorders; major adverse cardiovascular events; thrombosis; gastrointestinal perforations; neutropenia; lymphopenia; anemia; liver enzyme elevations; and lipid elevations.
Acitretin Pregnancy And Lactation Text: This medication is Pregnancy Category X and should not be given to women who are pregnant or may become pregnant in the future. This medication is excreted in breast milk.
Siliq Counseling:  I discussed with the patient the risks of Siliq including but not limited to new or worsening depression, suicidal thoughts and behavior, immunosuppression, malignancy, posterior leukoencephalopathy syndrome, and serious infections. The patient understands that monitoring is required including a PPD at baseline and must alert us or the primary physician if symptoms of infection or other concerning signs are noted. There is also a special program designed to monitor depression which is required with Siliq.
Libtayo Counseling- I discussed with the patient the risks of Libtayo including but not limited to nausea, vomiting, diarrhea, and bone or muscle pain. The patient verbalized understanding of the proper use and possible adverse effects of Libtayo. All of the patient's questions and concerns were addressed.
Picato Counseling:  I discussed with the patient the risks of Picato including but not limited to erythema, scaling, itching, weeping, crusting, and pain.
Winlevi Counseling:  I discussed with the patient the risks of topical clascoterone including but not limited to erythema, scaling, itching, and stinging. Patient voiced their understanding.
Low Dose Naltrexone Pregnancy And Lactation Text: Naltrexone is pregnancy category C. There have been no adequate and well-controlled studies in pregnant women. It should be used in pregnancy only if the potential benefit justifies the potential risk to the fetus. Limited data indicates that naltrexone is minimally excreted into breastmilk.
Birth Control Pills Pregnancy And Lactation Text: This medication should be avoided if pregnant and for the first 30 days post-partum.
Opioid Pregnancy And Lactation Text: These medications can lead to premature delivery and should be avoided during pregnancy. These medications are also present in breast milk in small amounts.
Griseofulvin Pregnancy And Lactation Text: This medication is Pregnancy Category X and is known to cause serious birth defects. It is unknown if this medication is excreted in breast milk but breast feeding should be avoided.
Otezla Counseling: Nolberto Celeste Counseling: The side effects of Nolberto Celeste were discussed with the patient, including but not limited to worsening or new depression, weight loss, diarrhea, nausea, upper respiratory tract infection, and headache. Patient instructed to call the office should any adverse effect occur. The patient verbalized understanding of the proper use and possible adverse effects of Otezla. All the patient's questions and concerns were addressed.
Azelaic Acid Counseling: Patient counseled that medicine may cause skin irritation and to avoid applying near the eyes. In the event of skin irritation, the patient was advised to reduce the amount of the drug applied or use it less frequently. The patient verbalized understanding of the proper use and possible adverse effects of azelaic acid. All of the patient's questions and concerns were addressed.
Rifampin Counseling: I discussed with the patient the risks of rifampin including but not limited to liver damage, kidney damage, red-orange body fluids, nausea/vomiting and severe allergy.
Adbry Counseling: I discussed with the patient the risks of tralokinumab including but not limited to eye infection and irritation, cold sores, injection site reactions, worsening of asthma, allergic reactions and increased risk of parasitic infection. Live vaccines should be avoided while taking tralokinumab. The patient understands that monitoring is required and they must alert us or the primary physician if symptoms of infection or other concerning signs are noted.
Drysol Counseling:  I discussed with the patient the risks of drysol/aluminum chloride including but not limited to skin rash, itching, irritation, burning.
Tranexamic Acid Counseling:  Patient advised of the small risk of bleeding problems with tranexamic acid. They were also instructed to call if they developed any nausea, vomiting or diarrhea. All of the patient's questions and concerns were addressed.
Doxycycline Pregnancy And Lactation Text: This medication is Pregnancy Category D and not consider safe during pregnancy. It is also excreted in breast milk but is considered safe for shorter treatment courses.
Doxepin Counseling:  Patient advised that the medication is sedating and not to drive a car after taking this medication. Patient informed of potential adverse effects including but not limited to dry mouth, urinary retention, and blurry vision. The patient verbalized understanding of the proper use and possible adverse effects of doxepin. All of the patient's questions and concerns were addressed.
Cyclosporine Counseling:  I discussed with the patient the risks of cyclosporine including but not limited to hypertension, gingival hyperplasia,myelosuppression, immunosuppression, liver damage, kidney damage, neurotoxicity, lymphoma, and serious infections. The patient understands that monitoring is required including baseline blood pressure, CBC, CMP, lipid panel and uric acid, and then 1-2 times monthly CMP and blood pressure.
Soolantra Counseling: I discussed with the patients the risks of topial Soolantra. This is a medicine which decreases the number of mites and inflammation in the skin. You experience burning, stinging, eye irritation or allergic reactions. Please call our office if you develop any problems from using this medication.
Taltz Counseling: I discussed with the patient the risks of ixekizumab including but not limited to immunosuppression, serious infections, worsening of inflammatory bowel disease and drug reactions. The patient understands that monitoring is required including a PPD at baseline and must alert us or the primary physician if symptoms of infection or other concerning signs are noted.
Dapsone Counseling: I discussed with the patient the risks of dapsone including but not limited to hemolytic anemia, agranulocytosis, rashes, methemoglobinemia, kidney failure, peripheral neuropathy, headaches, GI upset, and liver toxicity. Patients who start dapsone require monitoring including baseline LFTs and weekly CBCs for the first month, then every month thereafter. The patient verbalized understanding of the proper use and possible adverse effects of dapsone. All of the patient's questions and concerns were addressed.
Olumiant Pregnancy And Lactation Text: Based on animal studies, Claretha Littler may cause embryo-fetal harm when administered to pregnant women. The medication should not be used in pregnancy. Breastfeeding is not recommended during treatment.
Bexarotene Counseling:  I discussed with the patient the risks of bexarotene including but not limited to hair loss, dry lips/skin/eyes, liver abnormalities, hyperlipidemia, pancreatitis, depression/suicidal ideation, photosensitivity, drug rash/allergic reactions, hypothyroidism, anemia, leukopenia, infection, cataracts, and teratogenicity. Patient understands that they will need regular blood tests to check lipid profile, liver function tests, white blood cell count, thyroid function tests and pregnancy test if applicable.
Winlevi Pregnancy And Lactation Text: This medication is considered safe during pregnancy and breastfeeding.
Adbry Pregnancy And Lactation Text: It is unknown if this medication will adversely affect pregnancy or breast feeding.
Topical Metronidazole Counseling: Metronidazole is a topical antibiotic medication. You may experience burning, stinging, redness, or allergic reactions. Please call our office if you develop any problems from using this medication.
Azelaic Acid Pregnancy And Lactation Text: This medication is considered safe during pregnancy and breast feeding.
Rifampin Pregnancy And Lactation Text: This medication is Pregnancy Category C and it isn't know if it is safe during pregnancy. It is also excreted in breast milk and should not be used if you are breast feeding.
Humira Counseling:  I discussed with the patient the risks of adalimumab including but not limited to myelosuppression, immunosuppression, autoimmune hepatitis, demyelinating diseases, lymphoma, and serious infections. The patient understands that monitoring is required including a PPD at baseline and must alert us or the primary physician if symptoms of infection or other concerning signs are noted.
Libtayo Pregnancy And Lactation Text: This medication is contraindicated in pregnancy and when breast feeding.
Otezla Pregnancy And Lactation Text: This medication is Pregnancy Category C and it isn't known if it is safe during pregnancy. It is unknown if it is excreted in breast milk.
Itraconazole Counseling:  I discussed with the patient the risks of itraconazole including but not limited to liver damage, nausea/vomiting, neuropathy, and severe allergy. The patient understands that this medication is best absorbed when taken with acidic beverages such as non-diet cola or ginger ale. The patient understands that monitoring is required including baseline LFTs and repeat LFTs at intervals. The patient understands that they are to contact us or the primary physician if concerning signs are noted.
Klisyri Counseling:  I discussed with the patient the risks of Roque Rich including but not limited to erythema, scaling, itching, weeping, crusting, and pain.
Niacinamide Counseling: I recommended taking niacin or niacinamide, also know as vitamin B3, twice daily. Recent evidence suggests that taking vitamin B3 (500 mg twice daily) can reduce the risk of actinic keratoses and non-melanoma skin cancers. Side effects of vitamin B3 include flushing and headache.
Spironolactone Counseling: Patient advised regarding risks of diarrhea, abdominal pain, hyperkalemia, birth defects (for female patients), liver toxicity and renal toxicity. The patient may need blood work to monitor liver and kidney function and potassium levels while on therapy. The patient verbalized understanding of the proper use and possible adverse effects of spironolactone. All of the patient's questions and concerns were addressed.
Erythromycin Counseling:  I discussed with the patient the risks of erythromycin including but not limited to GI upset, allergic reaction, drug rash, diarrhea, increase in liver enzymes, and yeast infections.
Bexarotene Pregnancy And Lactation Text: This medication is Pregnancy Category X and should not be given to women who are pregnant or may become pregnant. This medication should not be used if you are breast feeding.
Doxepin Pregnancy And Lactation Text: This medication is Pregnancy Category C and it isn't known if it is safe during pregnancy. It is also excreted in breast milk and breast feeding isn't recommended.
Simponi Counseling:  I discussed with the patient the risks of golimumab including but not limited to myelosuppression, immunosuppression, autoimmune hepatitis, demyelinating diseases, lymphoma, and serious infections. The patient understands that monitoring is required including a PPD at baseline and must alert us or the primary physician if symptoms of infection or other concerning signs are noted.
VTAMA Counseling: I discussed with the patient that Dominik Ganong is not for use in the eyes, mouth or mouth. They should call the office if they develop any signs of allergic reactions to Dominik Ganong. The patient verbalized understanding of the proper use and possible adverse effects of VTAMA. All of the patient's questions and concerns were addressed.
Protopic Counseling: Patient may experience a mild burning sensation during topical application. Protopic is not approved in children less than 3years of age. There have been case reports of hematologic and skin malignancies in patients using topical calcineurin inhibitors although causality is questionable.
Soolantra Pregnancy And Lactation Text: This medication is Pregnancy Category C. This medication is considered safe during breast feeding.
Rinvoq Counseling: I discussed with the patient the risks of Rinvoq therapy including but not limited to upper respiratory tract infections, shingles, cold sores, bronchitis, nausea, cough, fever, acne, and headache. Live vaccines should be avoided. This medication has been linked to serious infections; higher rate of mortality; malignancy and lymphoproliferative disorders; major adverse cardiovascular events; thrombosis; thrombocytopenia, anemia, and neutropenia; lipid elevations; liver enzyme elevations; and gastrointestinal perforations.
Dapsone Pregnancy And Lactation Text: This medication is Pregnancy Category C and is not considered safe during pregnancy or breast feeding.
Tranexamic Acid Pregnancy And Lactation Text: It is unknown if this medication is safe during pregnancy or breast feeding.
Oxybutynin Counseling:  I discussed with the patient the risks of oxybutynin including but not limited to skin rash, drowsiness, dry mouth, difficulty urinating, and blurred vision.
Benzoyl Peroxide Counseling: Patient counseled that medicine may cause skin irritation and bleach clothing. In the event of skin irritation, the patient was advised to reduce the amount of the drug applied or use it less frequently. The patient verbalized understanding of the proper use and possible adverse effects of benzoyl peroxide. All of the patient's questions and concerns were addressed.
Odomzo Counseling- I discussed with the patient the risks of Odomzo including but not limited to nausea, vomiting, diarrhea, constipation, weight loss, changes in the sense of taste, decreased appetite, muscle spasms, and hair loss. The patient verbalized understanding of the proper use and possible adverse effects of Odomzo. All of the patient's questions and concerns were addressed.
Klisyri Pregnancy And Lactation Text: It is unknown if this medication can harm a developing fetus or if it is excreted in breast milk.
Sarecycline Counseling: Patient advised regarding possible photosensitivity and discoloration of the teeth, skin, lips, tongue and gums. Patient instructed to avoid sunlight, if possible. When exposed to sunlight, patients should wear protective clothing, sunglasses, and sunscreen. The patient was instructed to call the office immediately if the following severe adverse effects occur:  hearing changes, easy bruising/bleeding, severe headache, or vision changes. The patient verbalized understanding of the proper use and possible adverse effects of sarecycline. All of the patient's questions and concerns were addressed.
Cimzia Counseling:  I discussed with the patient the risks of Cimzia including but not limited to immunosuppression, allergic reactions and infections. The patient understands that monitoring is required including a PPD at baseline and must alert us or the primary physician if symptoms of infection or other concerning signs are noted.
Elidel Counseling: Patient may experience a mild burning sensation during topical application. Elidel is not approved in children less than 3years of age. There have been case reports of hematologic and skin malignancies in patients using topical calcineurin inhibitors although causality is questionable.
Erythromycin Pregnancy And Lactation Text: This medication is Pregnancy Category B and is considered safe during pregnancy. It is also excreted in breast milk.
Topical Metronidazole Pregnancy And Lactation Text: This medication is Pregnancy Category B and considered safe during pregnancy. It is also considered safe to use while breastfeeding.
Spironolactone Pregnancy And Lactation Text: This medication can cause feminization of the male fetus and should be avoided during pregnancy. The active metabolite is also found in breast milk.
Rinvoq Pregnancy And Lactation Text: Based on animal studies, Rinvoq may cause embryo-fetal harm when administered to pregnant women. The medication should not be used in pregnancy. Breastfeeding is not recommended during treatment and for 6 days after the last dose.
Topical Retinoid counseling:  Patient advised to apply a pea-sized amount only at bedtime and wait 30 minutes after washing their face before applying. If too drying, patient may add a non-comedogenic moisturizer. The patient verbalized understanding of the proper use and possible adverse effects of retinoids. All of the patient's questions and concerns were addressed.
Gabapentin Counseling: I discussed with the patient the risks of gabapentin including but not limited to dizziness, somnolence, fatigue and ataxia.
Niacinamide Pregnancy And Lactation Text: These medications are considered safe during pregnancy.
Methotrexate Counseling:  Patient counseled regarding adverse effects of methotrexate including but not limited to nausea, vomiting, abnormalities in liver function tests. Patients may develop mouth sores, rash, diarrhea, and abnormalities in blood counts. The patient understands that monitoring is required including LFT's and blood counts. There is a rare possibility of scarring of the liver and lung problems that can occur when taking methotrexate. Persistent nausea, loss of appetite, pale stools, dark urine, cough, and shortness of breath should be reported immediately. Patient advised to discontinue methotrexate treatment at least three months before attempting to become pregnant. I discussed the need for folate supplements while taking methotrexate. These supplements can decrease side effects during methotrexate treatment. The patient verbalized understanding of the proper use and possible adverse effects of methotrexate. All of the patient's questions and concerns were addressed.
Hydroxyzine Counseling: Patient advised that the medication is sedating and not to drive a car after taking this medication. Patient informed of potential adverse effects including but not limited to dry mouth, urinary retention, and blurry vision. The patient verbalized understanding of the proper use and possible adverse effects of hydroxyzine. All of the patient's questions and concerns were addressed.
Isotretinoin Counseling: Patient should get monthly blood tests, not donate blood, not drive at night if vision affected, not share medication, and not undergo elective surgery for 6 months after tx completed. Side effects reviewed, pt to contact office should one occur.
Ilumya Counseling: I discussed with the patient the risks of tildrakizumab including but not limited to immunosuppression, malignancy, posterior leukoencephalopathy syndrome, and serious infections. The patient understands that monitoring is required including a PPD at baseline and must alert us or the primary physician if symptoms of infection or other concerning signs are noted.
Protopic Pregnancy And Lactation Text: This medication is Pregnancy Category C. It is unknown if this medication is excreted in breast milk when applied topically.
Valtrex Counseling: I discussed with the patient the risks of valacyclovir including but not limited to kidney damage, nausea, vomiting and severe allergy. The patient understands that if the infection seems to be worsening or is not improving, they are to call.
Tremfya Counseling: I discussed with the patient the risks of guselkumab including but not limited to immunosuppression, serious infections, worsening of inflammatory bowel disease and drug reactions. The patient understands that monitoring is required including a PPD at baseline and must alert us or the primary physician if symptoms of infection or other concerning signs are noted.
Topical Steroids Counseling: I discussed with the patient that prolonged use of topical steroids can result in the increased appearance of superficial blood vessels (telangiectasias), lightening (hypopigmentation) and thinning of the skin (atrophy). Patient understands to avoid using high potency steroids in skin folds, the groin or the face. The patient verbalized understanding of the proper use and possible adverse effects of topical steroids. All of the patient's questions and concerns were addressed.
Azithromycin Counseling:  I discussed with the patient the risks of azithromycin including but not limited to GI upset, allergic reaction, drug rash, diarrhea, and yeast infections.
Benzoyl Peroxide Pregnancy And Lactation Text: This medication is Pregnancy Category C. It is unknown if benzoyl peroxide is excreted in breast milk.
Minoxidil Counseling: Minoxidil is a topical medication which can increase blood flow where it is applied. It is uncertain how this medication increases hair growth. Side effects are uncommon and include stinging and allergic reactions.
Methotrexate Pregnancy And Lactation Text: This medication is Pregnancy Category X and is known to cause fetal harm. This medication is excreted in breast milk.
Sotyktu Counseling:  I discussed the most common side effects of Sotyktu including: common cold, sore throat, sinus infections, cold sores, canker sores, folliculitis, and acne. ? I also discussed more serious side effects of Sotyktu including but not limited to: serious allergic reactions; increased risk for infections such as TB; cancers such as lymphomas; rhabdomyolysis and elevated CPK; and elevated triglycerides and liver enzymes. ?
Hydroxyzine Pregnancy And Lactation Text: This medication is not safe during pregnancy and should not be taken. It is also excreted in breast milk and breast feeding isn't recommended.
Metronidazole Counseling:  I discussed with the patient the risks of metronidazole including but not limited to seizures, nausea/vomiting, a metallic taste in the mouth, nausea/vomiting and severe allergy.
Cimzia Pregnancy And Lactation Text: This medication crosses the placenta but can be considered safe in certain situations. Cimzia may be excreted in breast milk.
Nsaids Counseling: NSAID Counseling: I discussed with the patient that NSAIDs should be taken with food. Prolonged use of NSAIDs can result in the development of stomach ulcers. Patient advised to stop taking NSAIDs if abdominal pain occurs. The patient verbalized understanding of the proper use and possible adverse effects of NSAIDs. All of the patient's questions and concerns were addressed.
Ketoconazole Counseling:   Patient counseled regarding improving absorption with orange juice. Adverse effects include but are not limited to breast enlargement, headache, diarrhea, nausea, upset stomach, liver function test abnormalities, taste disturbance, and stomach pain. There is a rare possibility of liver failure that can occur when taking ketoconazole. The patient understands that monitoring of LFTs may be required, especially at baseline. The patient verbalized understanding of the proper use and possible adverse effects of ketoconazole. All of the patient's questions and concerns were addressed.
Valtrex Pregnancy And Lactation Text: this medication is Pregnancy Category B and is considered safe during pregnancy. This medication is not directly found in breast milk but it's metabolite acyclovir is present.
Azathioprine Counseling:  I discussed with the patient the risks of azathioprine including but not limited to myelosuppression, immunosuppression, hepatotoxicity, lymphoma, and infections. The patient understands that monitoring is required including baseline LFTs, Creatinine, possible TPMP genotyping and weekly CBCs for the first month and then every 2 weeks thereafter. The patient verbalized understanding of the proper use and possible adverse effects of azathioprine. All of the patient's questions and concerns were addressed.
Qbrexza Counseling:  I discussed with the patient the risks of Rachelle Mirtha including but not limited to headache, mydriasis, blurred vision, dry eyes, nasal dryness, dry mouth, dry throat, dry skin, urinary hesitation, and constipation. Local skin reactions including erythema, burning, stinging, and itching can also occur.

## 2023-03-31 ENCOUNTER — APPOINTMENT (RX ONLY)
Dept: URBAN - METROPOLITAN AREA CLINIC 124 | Facility: CLINIC | Age: 78
Setting detail: DERMATOLOGY
End: 2023-03-31

## 2023-03-31 DIAGNOSIS — Z48.817 ENCOUNTER FOR SURGICAL AFTERCARE FOLLOWING SURGERY ON THE SKIN AND SUBCUTANEOUS TISSUE: ICD-10-CM

## 2023-03-31 PROCEDURE — ? POST-OP WOUND EVALUATION

## 2023-03-31 PROCEDURE — 99024 POSTOP FOLLOW-UP VISIT: CPT

## 2023-03-31 ASSESSMENT — LOCATION SIMPLE DESCRIPTION DERM: LOCATION SIMPLE: LEFT NOSE

## 2023-03-31 ASSESSMENT — LOCATION ZONE DERM: LOCATION ZONE: NOSE

## 2023-03-31 ASSESSMENT — LOCATION DETAILED DESCRIPTION DERM: LOCATION DETAILED: LEFT NASAL ALAR GROOVE

## 2023-03-31 NOTE — PROCEDURE: POST-OP WOUND EVALUATION
Body Location Override (Optional): Left nasal ala
Detail Level: Detailed
Wound Evaluated By (Optional): Jaylan Hudson., MD
Wound Diameter In Cm(Optional): 0
Wound Crusting?: clean
Sutures?: intact
Wound Color?: pink
Wound Bruising?: mild
Wound Granulation?: early

## 2024-01-31 ENCOUNTER — EMERGENCY (EMERGENCY)
Facility: HOSPITAL | Age: 79
LOS: 1 days | Discharge: LEFT BEFORE DISCHARGE | End: 2024-01-31
Attending: EMERGENCY MEDICINE | Admitting: EMERGENCY MEDICINE
Payer: MEDICARE

## 2024-01-31 VITALS
HEART RATE: 83 BPM | RESPIRATION RATE: 20 BRPM | TEMPERATURE: 97 F | DIASTOLIC BLOOD PRESSURE: 72 MMHG | OXYGEN SATURATION: 93 % | SYSTOLIC BLOOD PRESSURE: 134 MMHG

## 2024-01-31 VITALS
RESPIRATION RATE: 18 BRPM | HEIGHT: 72 IN | WEIGHT: 225.09 LBS | SYSTOLIC BLOOD PRESSURE: 96 MMHG | OXYGEN SATURATION: 94 % | DIASTOLIC BLOOD PRESSURE: 64 MMHG | HEART RATE: 77 BPM | TEMPERATURE: 97 F

## 2024-01-31 LAB
ALBUMIN SERPL ELPH-MCNC: 3.3 G/DL — SIGNIFICANT CHANGE UP (ref 3.3–5)
ALP SERPL-CCNC: 41 U/L — SIGNIFICANT CHANGE UP (ref 40–120)
ALT FLD-CCNC: 19 U/L — SIGNIFICANT CHANGE UP (ref 12–78)
ANION GAP SERPL CALC-SCNC: 3 MMOL/L — LOW (ref 5–17)
APTT BLD: 42.8 SEC — HIGH (ref 24.5–35.6)
AST SERPL-CCNC: 23 U/L — SIGNIFICANT CHANGE UP (ref 15–37)
BASOPHILS # BLD AUTO: 0.04 K/UL — SIGNIFICANT CHANGE UP (ref 0–0.2)
BASOPHILS NFR BLD AUTO: 0.4 % — SIGNIFICANT CHANGE UP (ref 0–2)
BILIRUB SERPL-MCNC: 1.1 MG/DL — SIGNIFICANT CHANGE UP (ref 0.2–1.2)
BUN SERPL-MCNC: 17 MG/DL — SIGNIFICANT CHANGE UP (ref 7–23)
CALCIUM SERPL-MCNC: 8.8 MG/DL — SIGNIFICANT CHANGE UP (ref 8.5–10.1)
CHLORIDE SERPL-SCNC: 106 MMOL/L — SIGNIFICANT CHANGE UP (ref 96–108)
CO2 SERPL-SCNC: 28 MMOL/L — SIGNIFICANT CHANGE UP (ref 22–31)
CREAT SERPL-MCNC: 1.2 MG/DL — SIGNIFICANT CHANGE UP (ref 0.5–1.3)
D DIMER BLD IA.RAPID-MCNC: <150 NG/ML DDU — SIGNIFICANT CHANGE UP
EGFR: 62 ML/MIN/1.73M2 — SIGNIFICANT CHANGE UP
EOSINOPHIL # BLD AUTO: 0.16 K/UL — SIGNIFICANT CHANGE UP (ref 0–0.5)
EOSINOPHIL NFR BLD AUTO: 1.5 % — SIGNIFICANT CHANGE UP (ref 0–6)
FLUAV AG NPH QL: SIGNIFICANT CHANGE UP
FLUBV AG NPH QL: SIGNIFICANT CHANGE UP
GLUCOSE SERPL-MCNC: 91 MG/DL — SIGNIFICANT CHANGE UP (ref 70–99)
HCT VFR BLD CALC: 33.1 % — LOW (ref 39–50)
HGB BLD-MCNC: 11.1 G/DL — LOW (ref 13–17)
IMM GRANULOCYTES NFR BLD AUTO: 0.5 % — SIGNIFICANT CHANGE UP (ref 0–0.9)
INR BLD: 3.11 RATIO — HIGH (ref 0.85–1.18)
LYMPHOCYTES # BLD AUTO: 0.71 K/UL — LOW (ref 1–3.3)
LYMPHOCYTES # BLD AUTO: 6.4 % — LOW (ref 13–44)
MCHC RBC-ENTMCNC: 30.7 PG — SIGNIFICANT CHANGE UP (ref 27–34)
MCHC RBC-ENTMCNC: 33.5 GM/DL — SIGNIFICANT CHANGE UP (ref 32–36)
MCV RBC AUTO: 91.4 FL — SIGNIFICANT CHANGE UP (ref 80–100)
MONOCYTES # BLD AUTO: 1.06 K/UL — HIGH (ref 0–0.9)
MONOCYTES NFR BLD AUTO: 9.6 % — SIGNIFICANT CHANGE UP (ref 2–14)
NEUTROPHILS # BLD AUTO: 8.99 K/UL — HIGH (ref 1.8–7.4)
NEUTROPHILS NFR BLD AUTO: 81.6 % — HIGH (ref 43–77)
NRBC # BLD: 0 /100 WBCS — SIGNIFICANT CHANGE UP (ref 0–0)
NT-PROBNP SERPL-SCNC: 3453 PG/ML — HIGH (ref 0–450)
PLATELET # BLD AUTO: 98 K/UL — LOW (ref 150–400)
POTASSIUM SERPL-MCNC: 3.6 MMOL/L — SIGNIFICANT CHANGE UP (ref 3.5–5.3)
POTASSIUM SERPL-SCNC: 3.6 MMOL/L — SIGNIFICANT CHANGE UP (ref 3.5–5.3)
PROT SERPL-MCNC: 6.8 G/DL — SIGNIFICANT CHANGE UP (ref 6–8.3)
PROTHROM AB SERPL-ACNC: 35.2 SEC — HIGH (ref 9.5–13)
RBC # BLD: 3.62 M/UL — LOW (ref 4.2–5.8)
RBC # FLD: 14.5 % — SIGNIFICANT CHANGE UP (ref 10.3–14.5)
RSV RNA NPH QL NAA+NON-PROBE: SIGNIFICANT CHANGE UP
SARS-COV-2 RNA SPEC QL NAA+PROBE: SIGNIFICANT CHANGE UP
SODIUM SERPL-SCNC: 137 MMOL/L — SIGNIFICANT CHANGE UP (ref 135–145)
TROPONIN I, HIGH SENSITIVITY RESULT: 21.3 NG/L — SIGNIFICANT CHANGE UP
WBC # BLD: 11.02 K/UL — HIGH (ref 3.8–10.5)
WBC # FLD AUTO: 11.02 K/UL — HIGH (ref 3.8–10.5)

## 2024-01-31 PROCEDURE — 84484 ASSAY OF TROPONIN QUANT: CPT

## 2024-01-31 PROCEDURE — 93005 ELECTROCARDIOGRAM TRACING: CPT

## 2024-01-31 PROCEDURE — 96374 THER/PROPH/DIAG INJ IV PUSH: CPT

## 2024-01-31 PROCEDURE — 85730 THROMBOPLASTIN TIME PARTIAL: CPT

## 2024-01-31 PROCEDURE — 93010 ELECTROCARDIOGRAM REPORT: CPT

## 2024-01-31 PROCEDURE — 87637 SARSCOV2&INF A&B&RSV AMP PRB: CPT

## 2024-01-31 PROCEDURE — 71046 X-RAY EXAM CHEST 2 VIEWS: CPT | Mod: 26

## 2024-01-31 PROCEDURE — 99284 EMERGENCY DEPT VISIT MOD MDM: CPT | Mod: FS

## 2024-01-31 PROCEDURE — 85379 FIBRIN DEGRADATION QUANT: CPT

## 2024-01-31 PROCEDURE — 94640 AIRWAY INHALATION TREATMENT: CPT

## 2024-01-31 PROCEDURE — 80053 COMPREHEN METABOLIC PANEL: CPT

## 2024-01-31 PROCEDURE — 99285 EMERGENCY DEPT VISIT HI MDM: CPT | Mod: 25

## 2024-01-31 PROCEDURE — 83880 ASSAY OF NATRIURETIC PEPTIDE: CPT

## 2024-01-31 PROCEDURE — 71046 X-RAY EXAM CHEST 2 VIEWS: CPT

## 2024-01-31 PROCEDURE — 36415 COLL VENOUS BLD VENIPUNCTURE: CPT

## 2024-01-31 PROCEDURE — 85025 COMPLETE CBC W/AUTO DIFF WBC: CPT

## 2024-01-31 PROCEDURE — 85610 PROTHROMBIN TIME: CPT

## 2024-01-31 RX ORDER — FUROSEMIDE 40 MG
20 TABLET ORAL ONCE
Refills: 0 | Status: COMPLETED | OUTPATIENT
Start: 2024-01-31 | End: 2024-01-31

## 2024-01-31 RX ORDER — IPRATROPIUM/ALBUTEROL SULFATE 18-103MCG
3 AEROSOL WITH ADAPTER (GRAM) INHALATION ONCE
Refills: 0 | Status: COMPLETED | OUTPATIENT
Start: 2024-01-31 | End: 2024-01-31

## 2024-01-31 RX ADMIN — Medication 20 MILLIGRAM(S): at 21:49

## 2024-01-31 RX ADMIN — Medication 3 MILLILITER(S): at 18:52

## 2024-01-31 NOTE — ED PROVIDER NOTE - NSFOLLOWUPINSTRUCTIONS_ED_ALL_ED_FT
Follow up with your primary care doctor, cardiology, and pulmonology.       You are leaving against medical advice (AMA).  This may result in recurrent or worsening symptoms, severe permanent disability, pain and suffering, harm, injury, and/or even death.  The risks, benefits, and alternatives to treatment as well as the attendant risks of refusing treatment at this time have been discussed.  You have demonstrated comprehension and verbalized understanding of these risks.  If you change your mind, please return to the ER immediately.  Please return to the ER immediately for persistent or recurring symptoms, worsening symptoms, or any other problems or concerns.  Please contact the ER if you have any further questions or concerns.     Shortness of Breath, Adult    Shortness of breath means you have trouble breathing. Shortness of breath could be a sign of a medical problem.    Follow these instructions at home:  A sign showing that a person should not smoke.  Pollution    Do not smoke or use any products that contain nicotine or tobacco. If you need help quitting, ask your doctor.  Avoid things that can make it harder to breathe, such as:  Smoke of all kinds. This includes smoke from campfires or forest fires. Do not smoke or allow others to smoke in your home.  Mold.  Dust.  Air pollution.  Chemical smells.  Things that can give you an allergic reaction (allergens) if you have allergies.  Keep your living space clean. Use products that help remove mold and dust.  General instructions    Watch for any changes in your symptoms.  Take over-the-counter and prescription medicines only as told by your doctor. This includes oxygen therapy and inhaled medicines.  Rest as needed.  Return to your normal activities when your doctor says that it is safe.  Keep all follow-up visits.  Contact a doctor if:  Your condition does not get better as soon as expected.  You have a hard time doing your normal activities, even after you rest.  You have new symptoms.  You cannot walk up stairs.  You cannot exercise the way you normally do.  Get help right away if:  Your shortness of breath gets worse.  You have trouble breathing when you are resting.  You feel light-headed or you faint.  You have a cough that is not helped by medicines.  You cough up blood.  You have pain with breathing.  You have pain in your chest, arms, shoulders, or belly (abdomen).  You have a fever.  These symptoms may be an emergency. Get help right away. Call 911.  Do not wait to see if the symptoms will go away.  Do not drive yourself to the hospital.  Summary  Shortness of breath is when you have trouble breathing enough air. It can be a sign of a medical problem.  Avoid things that make it hard for you to breathe, such as smoking, pollution, mold, and dust.  Watch for any changes in your symptoms. Contact your doctor if you do not get better or you get worse.  This information is not intended to replace advice given to you by your health care provider. Make sure you discuss any questions you have with your health care provider.    Document Revised: 08/06/2022 Document Reviewed: 08/06/2022  Elsevier Patient Education © 2023 [employer]

## 2024-01-31 NOTE — ED PROVIDER NOTE - PHYSICAL EXAMINATION
Constitutional: Awake, Alert, non-toxic. NAD  HEAD: Normocephalic, atraumatic.   EYES: EOM intact, conjunctiva and sclera are clear bilaterally.   ENT: No rhinorrhea, patent, mucous membranes pink/moist, no drooling or stridor.   NECK: Supple, non-tender  CARDIOVASCULAR: Normal S1, S2; regular rate and rhythm.  RESPIRATORY: Normal respiratory effort; breath sounds CTAB, (+) minimal wheezes, no rhonchi or rales. Speaking in full sentences. No accessory muscle use.   ABDOMEN: Soft; non-tender, non-distended.   EXTREMITIES: Full passive and active ROM in all extremities; non-tender to palpation; distal pulses palpable and symmetric, no LE edema.   SKIN: Warm, dry; good skin turgor, no apparent lesions or rashes, no ecchymosis, brisk capillary refill.  NEURO: A&O x3. Sensory and motor functions are grossly intact. Speech is normal. Appearance and judgement seem appropriate for gender and age.

## 2024-01-31 NOTE — ED PROVIDER NOTE - PROGRESS NOTE DETAILS
pt slightly hypoxic at 90-95%. advised patient would like to admit but patient refusing. pt signing out AMA.

## 2024-01-31 NOTE — ED PROVIDER NOTE - NSICDXPASTSURGICALHX_GEN_ALL_CORE_FT
PAST SURGICAL HISTORY:  AICD (automatic cardioverter/defibrillator) present     History of cholecystectomy     History of coronary artery bypass surgery 4 vessel    History of hernia surgery Umbilical Hernia Repair

## 2024-01-31 NOTE — ED PROVIDER NOTE - NSICDXPASTMEDICALHX_GEN_ALL_CORE_FT
PAST MEDICAL HISTORY:  Atrial fibrillation     CAD (coronary artery disease) s/p stents then 4v CABG. First MI in his early 40s.    CHF (congestive heart failure)     Gout     HTN - Hypertension     Hyperlipidemia     MI, old     Obesity     ABBY (obstructive sleep apnea)     Osteoarthritis     PTSD (post-traumatic stress disorder)

## 2024-01-31 NOTE — ED PROVIDER NOTE - PATIENT PORTAL LINK FT
You can access the FollowMyHealth Patient Portal offered by [facility] by registering at the following website: [email or web address]. By joining [facility]’s FollowMyHealth portal, you will also be able to view your health information using other applications (apps) compatible with our system.

## 2024-01-31 NOTE — ED PROVIDER NOTE - CLINICAL SUMMARY MEDICAL DECISION MAKING FREE TEXT BOX
JPATEL: 79 y/o male with hx of 4vCABG, CHF, atrial fibrillation, pacemaker with defibrillator, HTN, HLD, p/w with SOB started 2 days ago, pt states he had sore throat, cough, c/o burning CP, no fever, no chills, no n/v, no body aches, + postnasal drip, cough initially productive now dry, no abdominal pain, no leg pain, no leg swelling, quit tobacco many years ago  Vitals reviewed, Pt with resting pulse ox 90%, Head: atraumatic, PERRLA, EOMI, moist mucous membranes, Neck: supple, Chest: clear to ausculation, Cardiac: regular rate and rhythm, Abdomen: soft and nontender, Extremities: well perfused and without edema, Skin: intact, no visible rash, Psych: appropriate mood, Neuro: AxOx3, no focal neurologic deficit    Ddx includes but not limited to: COVID, RSV, FLU, ACS, CHF, PNA,   Will get CBC, CMP, CE, Viral panel, CXR, EKG, Duoneb  Will most likely need to admit

## 2024-01-31 NOTE — ED PROVIDER NOTE - OBJECTIVE STATEMENT
78-year-old male with past medical history of CAD, CHF, atrial fibrillation, benign hypertension, hyperlipidemia presents today due to chest congestion.  Patient reports that his symptoms initially started with nasal congestion, sore throat then progressed to chest congestion and cough.  Patient admits to some exertional shortness of breath.  Patient denies chest pain, palpitations, syncope, leg swelling, fever, or any other complaints.

## 2024-01-31 NOTE — ED PROVIDER NOTE - ATTENDING APP SHARED VISIT CONTRIBUTION OF CARE
Attending MD Velásquez;:   I personally have seen and examined this patient.  Physician assistant note reviewed and agree on plan of care and except where noted.  See MDM for details.